# Patient Record
Sex: FEMALE | Race: WHITE | NOT HISPANIC OR LATINO | Employment: UNEMPLOYED | URBAN - NONMETROPOLITAN AREA
[De-identification: names, ages, dates, MRNs, and addresses within clinical notes are randomized per-mention and may not be internally consistent; named-entity substitution may affect disease eponyms.]

---

## 2021-08-03 ENCOUNTER — OFFICE VISIT (OUTPATIENT)
Dept: FAMILY MEDICINE | Facility: CLINIC | Age: 41
End: 2021-08-03

## 2021-08-03 VITALS
SYSTOLIC BLOOD PRESSURE: 138 MMHG | WEIGHT: 156 LBS | DIASTOLIC BLOOD PRESSURE: 78 MMHG | HEART RATE: 89 BPM | RESPIRATION RATE: 18 BRPM | BODY MASS INDEX: 29.45 KG/M2 | HEIGHT: 61 IN | TEMPERATURE: 99 F | OXYGEN SATURATION: 98 %

## 2021-08-03 DIAGNOSIS — Z72.51 HIGH RISK SEXUAL BEHAVIOR, UNSPECIFIED TYPE: ICD-10-CM

## 2021-08-03 DIAGNOSIS — Z03.818 ENCNTR FOR OBS FOR SUSP EXPSR TO OTH BIOLG AGENTS RULED OUT: ICD-10-CM

## 2021-08-03 DIAGNOSIS — R52 BODY ACHES: ICD-10-CM

## 2021-08-03 DIAGNOSIS — N39.0 URINARY TRACT INFECTION WITHOUT HEMATURIA, SITE UNSPECIFIED: Primary | ICD-10-CM

## 2021-08-03 DIAGNOSIS — R30.0 DYSURIA: ICD-10-CM

## 2021-08-03 LAB
AMORPH PHOS CRY #/AREA URNS LPF: ABNORMAL /LPF
BACTERIA #/AREA URNS HPF: ABNORMAL /HPF
BILIRUB UR QL STRIP: NEGATIVE
CLARITY UR: ABNORMAL
COLOR UR: YELLOW
CTP QC/QA: YES
FLUAV AG NPH QL: NEGATIVE
FLUBV AG NPH QL: NEGATIVE
GLUCOSE UR STRIP-MCNC: NEGATIVE MG/DL
HAV IGM SER QL: NORMAL
HBV CORE IGM SER QL: NORMAL
HIV 1+O+2 AB SERPL QL: NORMAL
KETONES UR STRIP-SCNC: 15 MG/DL
LEUKOCYTE ESTERASE UR QL STRIP: ABNORMAL
NITRITE UR QL STRIP: NEGATIVE
PH UR STRIP: 5.5 PH UNITS
PROT UR QL STRIP: 100
RBC # UR STRIP: ABNORMAL /UL
RBC #/AREA URNS HPF: ABNORMAL /HPF
SARS-COV-2 AG RESP QL IA.RAPID: NEGATIVE
SP GR UR STRIP: 1.02
SQUAMOUS #/AREA URNS LPF: ABNORMAL /LPF
UROBILINOGEN UR STRIP-ACNC: 0.2 MG/DL
WBC #/AREA URNS HPF: ABNORMAL /HPF

## 2021-08-03 PROCEDURE — 87591 CHLAMYDIA/GONORRHOEAE(GC), PCR: ICD-10-PCS | Mod: ,,, | Performed by: CLINICAL MEDICAL LABORATORY

## 2021-08-03 PROCEDURE — 87529 HSV DNA AMP PROBE: CPT | Mod: 90,,, | Performed by: CLINICAL MEDICAL LABORATORY

## 2021-08-03 PROCEDURE — 81001 URINALYSIS, REFLEX TO URINE CULTURE: ICD-10-PCS | Mod: ,,, | Performed by: CLINICAL MEDICAL LABORATORY

## 2021-08-03 PROCEDURE — 87077 CULTURE, URINE: ICD-10-PCS | Mod: ,,, | Performed by: CLINICAL MEDICAL LABORATORY

## 2021-08-03 PROCEDURE — 87529 HERPES SIMPLEX VIRUS-PCR (BLOOD): ICD-10-PCS | Mod: 90,,, | Performed by: CLINICAL MEDICAL LABORATORY

## 2021-08-03 PROCEDURE — 87428 POCT SARS-COV2 (COVID) WITH FLU ANTIGEN: ICD-10-PCS | Mod: QW,,, | Performed by: FAMILY MEDICINE

## 2021-08-03 PROCEDURE — 87591 N.GONORRHOEAE DNA AMP PROB: CPT | Mod: ,,, | Performed by: CLINICAL MEDICAL LABORATORY

## 2021-08-03 PROCEDURE — 96372 PR INJECTION,THERAP/PROPH/DIAG2ST, IM OR SUBCUT: ICD-10-PCS | Mod: ,,, | Performed by: FAMILY MEDICINE

## 2021-08-03 PROCEDURE — 99214 OFFICE O/P EST MOD 30 MIN: CPT | Mod: 25,,, | Performed by: FAMILY MEDICINE

## 2021-08-03 PROCEDURE — 87186 SC STD MICRODIL/AGAR DIL: CPT | Mod: ,,, | Performed by: CLINICAL MEDICAL LABORATORY

## 2021-08-03 PROCEDURE — 96372 THER/PROPH/DIAG INJ SC/IM: CPT | Mod: ,,, | Performed by: FAMILY MEDICINE

## 2021-08-03 PROCEDURE — 87428 SARSCOV & INF VIR A&B AG IA: CPT | Mod: QW,,, | Performed by: FAMILY MEDICINE

## 2021-08-03 PROCEDURE — 87491 CHLAMYDIA/GONORRHOEAE(GC), PCR: ICD-10-PCS | Mod: ,,, | Performed by: CLINICAL MEDICAL LABORATORY

## 2021-08-03 PROCEDURE — 87086 CULTURE, URINE: ICD-10-PCS | Mod: ,,, | Performed by: CLINICAL MEDICAL LABORATORY

## 2021-08-03 PROCEDURE — 87077 CULTURE AEROBIC IDENTIFY: CPT | Mod: ,,, | Performed by: CLINICAL MEDICAL LABORATORY

## 2021-08-03 PROCEDURE — 86709 HEPATITIS A ANTIBODY, IGM: ICD-10-PCS | Mod: ,,, | Performed by: CLINICAL MEDICAL LABORATORY

## 2021-08-03 PROCEDURE — 87491 CHLMYD TRACH DNA AMP PROBE: CPT | Mod: ,,, | Performed by: CLINICAL MEDICAL LABORATORY

## 2021-08-03 PROCEDURE — 81001 URINALYSIS AUTO W/SCOPE: CPT | Mod: ,,, | Performed by: CLINICAL MEDICAL LABORATORY

## 2021-08-03 PROCEDURE — 99214 PR OFFICE/OUTPT VISIT, EST, LEVL IV, 30-39 MIN: ICD-10-PCS | Mod: 25,,, | Performed by: FAMILY MEDICINE

## 2021-08-03 PROCEDURE — 86709 HEPATITIS A IGM ANTIBODY: CPT | Mod: ,,, | Performed by: CLINICAL MEDICAL LABORATORY

## 2021-08-03 PROCEDURE — 87186 CULTURE, URINE: ICD-10-PCS | Mod: ,,, | Performed by: CLINICAL MEDICAL LABORATORY

## 2021-08-03 PROCEDURE — 86705 HEPATITIS B CORE ANTIBODY, IGM: ICD-10-PCS | Mod: ,,, | Performed by: CLINICAL MEDICAL LABORATORY

## 2021-08-03 PROCEDURE — 87389 HIV 1 / 2 ANTIBODY: ICD-10-PCS | Mod: ,,, | Performed by: CLINICAL MEDICAL LABORATORY

## 2021-08-03 PROCEDURE — 87389 HIV-1 AG W/HIV-1&-2 AB AG IA: CPT | Mod: ,,, | Performed by: CLINICAL MEDICAL LABORATORY

## 2021-08-03 PROCEDURE — 87086 URINE CULTURE/COLONY COUNT: CPT | Mod: ,,, | Performed by: CLINICAL MEDICAL LABORATORY

## 2021-08-03 PROCEDURE — 86705 HEP B CORE ANTIBODY IGM: CPT | Mod: ,,, | Performed by: CLINICAL MEDICAL LABORATORY

## 2021-08-03 RX ORDER — CIPROFLOXACIN 500 MG/1
500 TABLET ORAL EVERY 12 HOURS
Qty: 20 TABLET | Refills: 0 | Status: SHIPPED | OUTPATIENT
Start: 2021-08-03 | End: 2021-11-30 | Stop reason: ALTCHOICE

## 2021-08-03 RX ORDER — KETOROLAC TROMETHAMINE 30 MG/ML
60 INJECTION, SOLUTION INTRAMUSCULAR; INTRAVENOUS
Status: COMPLETED | OUTPATIENT
Start: 2021-08-03 | End: 2021-08-03

## 2021-08-03 RX ORDER — CEFTRIAXONE 1 G/1
1 INJECTION, POWDER, FOR SOLUTION INTRAMUSCULAR; INTRAVENOUS
Status: COMPLETED | OUTPATIENT
Start: 2021-08-03 | End: 2021-08-03

## 2021-08-03 RX ADMIN — KETOROLAC TROMETHAMINE 60 MG: 30 INJECTION, SOLUTION INTRAMUSCULAR; INTRAVENOUS at 09:08

## 2021-08-03 RX ADMIN — CEFTRIAXONE 1 G: 1 INJECTION, POWDER, FOR SOLUTION INTRAMUSCULAR; INTRAVENOUS at 09:08

## 2021-08-04 LAB
CHLAMYDIA BY PCR: NEGATIVE
N. GONORRHOEAE (GC) BY PCR: NEGATIVE
SYPHILIS AB INTERPRETATION: NORMAL

## 2021-08-04 PROCEDURE — 86780 TREPONEMA PALLIDUM (SYPHILIS) ANTIBODY: ICD-10-PCS | Mod: ,,, | Performed by: CLINICAL MEDICAL LABORATORY

## 2021-08-04 PROCEDURE — 86780 TREPONEMA PALLIDUM: CPT | Mod: ,,, | Performed by: CLINICAL MEDICAL LABORATORY

## 2021-08-05 LAB — UA COMPLETE W REFLEX CULTURE PNL UR: ABNORMAL

## 2021-08-06 LAB
HSV1 DNA BLD QL NAA+PROBE: NEGATIVE
HSV2 DNA BLD QL NAA+PROBE: NEGATIVE

## 2021-09-30 ENCOUNTER — TELEPHONE (OUTPATIENT)
Dept: FAMILY MEDICINE | Facility: CLINIC | Age: 41
End: 2021-09-30

## 2021-09-30 ENCOUNTER — OFFICE VISIT (OUTPATIENT)
Dept: FAMILY MEDICINE | Facility: CLINIC | Age: 41
End: 2021-09-30
Payer: OTHER GOVERNMENT

## 2021-09-30 VITALS
HEART RATE: 87 BPM | DIASTOLIC BLOOD PRESSURE: 87 MMHG | TEMPERATURE: 98 F | SYSTOLIC BLOOD PRESSURE: 128 MMHG | WEIGHT: 158 LBS | OXYGEN SATURATION: 98 % | BODY MASS INDEX: 29.83 KG/M2 | RESPIRATION RATE: 18 BRPM | HEIGHT: 61 IN

## 2021-09-30 DIAGNOSIS — Z76.89 ENCOUNTER FOR ASSESSMENT OF ALCOHOL AND DRUG USE: Primary | ICD-10-CM

## 2021-09-30 LAB
CTP QC/QA: YES
POC (AMP) AMPHETAMINE: NEGATIVE
POC (BAR) BARBITURATES: NEGATIVE
POC (BUP) BUPRENORPHINE: NEGATIVE
POC (BZO) BENZODIAZEPINES: ABNORMAL
POC (COC) COCAINE: NEGATIVE
POC (MDMA) METHYLENEDIOXYMETHAMPHETAMINE 3,4: NEGATIVE
POC (MET) METHAMPHETAMINE: NEGATIVE
POC (MOP) OPIATES: NEGATIVE
POC (MTD) METHADONE: NEGATIVE
POC (OXY) OXYCODONE: ABNORMAL
POC (PCP) PHENCYCLIDINE: NEGATIVE
POC (TCA) TRICYCLIC ANTIDEPRESSANTS: NEGATIVE
POC TEMPERATURE (URINE): 90
POC THC: NEGATIVE

## 2021-09-30 PROCEDURE — 80305 POCT URINE DRUG SCREEN PRESUMP: ICD-10-PCS | Mod: QW,GZ,, | Performed by: NURSE PRACTITIONER

## 2021-09-30 PROCEDURE — 99212 PR OFFICE/OUTPT VISIT, EST, LEVL II, 10-19 MIN: ICD-10-PCS | Mod: ,,, | Performed by: NURSE PRACTITIONER

## 2021-09-30 PROCEDURE — 80305 DRUG TEST PRSMV DIR OPT OBS: CPT | Mod: QW,GZ,, | Performed by: NURSE PRACTITIONER

## 2021-09-30 PROCEDURE — 99212 OFFICE O/P EST SF 10 MIN: CPT | Mod: ,,, | Performed by: NURSE PRACTITIONER

## 2021-10-29 ENCOUNTER — OFFICE VISIT (OUTPATIENT)
Dept: FAMILY MEDICINE | Facility: CLINIC | Age: 41
End: 2021-10-29

## 2021-10-29 VITALS
DIASTOLIC BLOOD PRESSURE: 99 MMHG | SYSTOLIC BLOOD PRESSURE: 168 MMHG | BODY MASS INDEX: 31.15 KG/M2 | RESPIRATION RATE: 16 BRPM | HEIGHT: 61 IN | OXYGEN SATURATION: 99 % | TEMPERATURE: 98 F | WEIGHT: 165 LBS | HEART RATE: 73 BPM

## 2021-10-29 DIAGNOSIS — Z76.89 ENCOUNTER FOR ASSESSMENT OF ALCOHOL AND DRUG USE: Primary | ICD-10-CM

## 2021-10-29 PROCEDURE — 80305 DRUG TEST PRSMV DIR OPT OBS: CPT | Mod: QW,,, | Performed by: NURSE PRACTITIONER

## 2021-10-29 PROCEDURE — 80305 POCT URINE DRUG SCREEN PRESUMP: ICD-10-PCS | Mod: QW,,, | Performed by: NURSE PRACTITIONER

## 2021-10-29 PROCEDURE — 99212 OFFICE O/P EST SF 10 MIN: CPT | Mod: ,,, | Performed by: NURSE PRACTITIONER

## 2021-10-29 PROCEDURE — 99212 PR OFFICE/OUTPT VISIT, EST, LEVL II, 10-19 MIN: ICD-10-PCS | Mod: ,,, | Performed by: NURSE PRACTITIONER

## 2021-11-30 ENCOUNTER — OFFICE VISIT (OUTPATIENT)
Dept: FAMILY MEDICINE | Facility: CLINIC | Age: 41
End: 2021-11-30
Payer: OTHER GOVERNMENT

## 2021-11-30 VITALS
SYSTOLIC BLOOD PRESSURE: 120 MMHG | TEMPERATURE: 98 F | DIASTOLIC BLOOD PRESSURE: 76 MMHG | BODY MASS INDEX: 32.12 KG/M2 | WEIGHT: 170 LBS | RESPIRATION RATE: 16 BRPM | OXYGEN SATURATION: 98 % | HEART RATE: 85 BPM

## 2021-11-30 DIAGNOSIS — Z76.89 ENCOUNTER FOR ASSESSMENT OF ALCOHOL AND DRUG USE: Primary | ICD-10-CM

## 2021-11-30 LAB
CTP QC/QA: YES
POC (AMP) AMPHETAMINE: NEGATIVE
POC (BAR) BARBITURATES: NEGATIVE
POC (BUP) BUPRENORPHINE: NEGATIVE
POC (BZO) BENZODIAZEPINES: NEGATIVE
POC (COC) COCAINE: NEGATIVE
POC (MDMA) METHYLENEDIOXYMETHAMPHETAMINE 3,4: NEGATIVE
POC (MET) METHAMPHETAMINE: NEGATIVE
POC (MOP) OPIATES: ABNORMAL
POC (MTD) METHADONE: NEGATIVE
POC (OXY) OXYCODONE: NEGATIVE
POC (PCP) PHENCYCLIDINE: NEGATIVE
POC (TCA) TRICYCLIC ANTIDEPRESSANTS: NEGATIVE
POC TEMPERATURE (URINE): 92
POC THC: NEGATIVE

## 2021-11-30 PROCEDURE — 99212 PR OFFICE/OUTPT VISIT, EST, LEVL II, 10-19 MIN: ICD-10-PCS | Mod: ,,, | Performed by: INTERNAL MEDICINE

## 2021-11-30 PROCEDURE — 99212 OFFICE O/P EST SF 10 MIN: CPT | Mod: ,,, | Performed by: INTERNAL MEDICINE

## 2021-11-30 PROCEDURE — 80305 DRUG TEST PRSMV DIR OPT OBS: CPT | Mod: QW,,, | Performed by: INTERNAL MEDICINE

## 2021-11-30 PROCEDURE — 80305 POCT URINE DRUG SCREEN PRESUMP: ICD-10-PCS | Mod: QW,,, | Performed by: INTERNAL MEDICINE

## 2021-12-03 ENCOUNTER — OFFICE VISIT (OUTPATIENT)
Dept: FAMILY MEDICINE | Facility: CLINIC | Age: 41
End: 2021-12-03

## 2021-12-03 VITALS
TEMPERATURE: 99 F | RESPIRATION RATE: 20 BRPM | WEIGHT: 168.38 LBS | OXYGEN SATURATION: 98 % | HEIGHT: 61 IN | SYSTOLIC BLOOD PRESSURE: 151 MMHG | BODY MASS INDEX: 31.79 KG/M2 | DIASTOLIC BLOOD PRESSURE: 106 MMHG | HEART RATE: 83 BPM

## 2021-12-03 DIAGNOSIS — Z76.89 ENCOUNTER FOR ASSESSMENT OF ALCOHOL AND DRUG USE: Primary | ICD-10-CM

## 2021-12-03 DIAGNOSIS — Z03.818 ENCNTR FOR OBS FOR SUSP EXPSR TO OTH BIOLG AGENTS RULED OUT: ICD-10-CM

## 2021-12-03 LAB
CTP QC/QA: NORMAL
POC (AMP) AMPHETAMINE: NORMAL
POC (BAR) BARBITURATES: NORMAL
POC (BUP) BUPRENORPHINE: NORMAL
POC (BZO) BENZODIAZEPINES: NORMAL
POC (COC) COCAINE: NORMAL
POC (MDMA) METHYLENEDIOXYMETHAMPHETAMINE 3,4: NORMAL
POC (MET) METHAMPHETAMINE: NORMAL
POC (MOP) OPIATES: NORMAL
POC (MTD) METHADONE: NORMAL
POC (OXY) OXYCODONE: NORMAL
POC (PCP) PHENCYCLIDINE: NORMAL
POC (TCA) TRICYCLIC ANTIDEPRESSANTS: NORMAL
POC TEMPERATURE (URINE): NORMAL
POC THC: NORMAL

## 2021-12-03 PROCEDURE — 99212 PR OFFICE/OUTPT VISIT, EST, LEVL II, 10-19 MIN: ICD-10-PCS | Mod: ,,, | Performed by: NURSE PRACTITIONER

## 2021-12-03 PROCEDURE — 99212 OFFICE O/P EST SF 10 MIN: CPT | Mod: ,,, | Performed by: NURSE PRACTITIONER

## 2021-12-03 PROCEDURE — 80305 POCT URINE DRUG SCREEN PRESUMP: ICD-10-PCS | Mod: QW,,, | Performed by: NURSE PRACTITIONER

## 2021-12-03 PROCEDURE — 80305 DRUG TEST PRSMV DIR OPT OBS: CPT | Mod: QW,,, | Performed by: NURSE PRACTITIONER

## 2021-12-06 LAB

## 2023-04-18 ENCOUNTER — HOSPITAL ENCOUNTER (EMERGENCY)
Facility: HOSPITAL | Age: 43
Discharge: HOME | End: 2023-04-18
Attending: FAMILY MEDICINE

## 2023-04-18 VITALS
BODY MASS INDEX: 32.66 KG/M2 | OXYGEN SATURATION: 99 % | HEIGHT: 61 IN | DIASTOLIC BLOOD PRESSURE: 105 MMHG | SYSTOLIC BLOOD PRESSURE: 192 MMHG | RESPIRATION RATE: 17 BRPM | WEIGHT: 173 LBS | TEMPERATURE: 98 F | HEART RATE: 111 BPM

## 2023-04-18 VITALS
DIASTOLIC BLOOD PRESSURE: 101 MMHG | BODY MASS INDEX: 32.66 KG/M2 | HEIGHT: 61 IN | RESPIRATION RATE: 22 BRPM | WEIGHT: 173 LBS | SYSTOLIC BLOOD PRESSURE: 197 MMHG | OXYGEN SATURATION: 100 %

## 2023-04-18 DIAGNOSIS — R20.2 PARESTHESIA: ICD-10-CM

## 2023-04-18 DIAGNOSIS — I10 HYPERTENSION, UNSPECIFIED TYPE: Primary | ICD-10-CM

## 2023-04-18 DIAGNOSIS — R11.2 NAUSEA AND VOMITING, UNSPECIFIED VOMITING TYPE: Primary | ICD-10-CM

## 2023-04-18 LAB
ALBUMIN SERPL BCP-MCNC: 4.8 G/DL (ref 3.5–5)
ALBUMIN/GLOB SERPL: 1.2 {RATIO}
ALP SERPL-CCNC: 84 U/L (ref 37–98)
ALT SERPL W P-5'-P-CCNC: 22 U/L (ref 13–56)
AMYLASE SERPL-CCNC: 17 U/L (ref 25–115)
ANION GAP SERPL CALCULATED.3IONS-SCNC: 19 MMOL/L (ref 7–16)
ANION GAP SERPL CALCULATED.3IONS-SCNC: 24 MMOL/L (ref 7–16)
AST SERPL W P-5'-P-CCNC: 16 U/L (ref 15–37)
BASOPHILS # BLD AUTO: 0.07 K/UL (ref 0–0.2)
BASOPHILS NFR BLD AUTO: 0.4 % (ref 0–1)
BILIRUB SERPL-MCNC: 0.5 MG/DL (ref ?–1.2)
BUN SERPL-MCNC: 17 MG/DL (ref 7–18)
BUN SERPL-MCNC: 18 MG/DL (ref 7–18)
BUN/CREAT SERPL: 13 (ref 6–20)
BUN/CREAT SERPL: 17 (ref 6–20)
CALCIUM SERPL-MCNC: 9 MG/DL (ref 8.5–10.1)
CALCIUM SERPL-MCNC: 9.7 MG/DL (ref 8.5–10.1)
CHLORIDE SERPL-SCNC: 97 MMOL/L (ref 98–107)
CHLORIDE SERPL-SCNC: 97 MMOL/L (ref 98–107)
CO2 SERPL-SCNC: 18 MMOL/L (ref 21–32)
CO2 SERPL-SCNC: 24 MMOL/L (ref 21–32)
CREAT SERPL-MCNC: 1.09 MG/DL (ref 0.55–1.02)
CREAT SERPL-MCNC: 1.28 MG/DL (ref 0.55–1.02)
DIFFERENTIAL METHOD BLD: ABNORMAL
EGFR (NO RACE VARIABLE) (RUSH/TITUS): 54 ML/MIN/1.73M²
EGFR (NO RACE VARIABLE) (RUSH/TITUS): 66 ML/MIN/1.73M²
EOSINOPHIL # BLD AUTO: 0.01 K/UL (ref 0–0.5)
EOSINOPHIL NFR BLD AUTO: 0.1 % (ref 1–4)
ERYTHROCYTE [DISTWIDTH] IN BLOOD BY AUTOMATED COUNT: 15.4 % (ref 11.5–14.5)
GLOBULIN SER-MCNC: 4 G/DL (ref 2–4)
GLUCOSE SERPL-MCNC: 206 MG/DL (ref 74–106)
GLUCOSE SERPL-MCNC: 212 MG/DL (ref 70–105)
GLUCOSE SERPL-MCNC: 285 MG/DL (ref 74–106)
HCT VFR BLD AUTO: 37.8 % (ref 38–47)
HGB BLD-MCNC: 12.6 G/DL (ref 12–16)
HYPOCHROMIA BLD QL SMEAR: ABNORMAL
LIPASE SERPL-CCNC: 61 U/L (ref 73–393)
LYMPHOCYTES # BLD AUTO: 1.67 K/UL (ref 1–4.8)
LYMPHOCYTES NFR BLD AUTO: 9.5 % (ref 27–41)
LYMPHOCYTES NFR BLD MANUAL: 11 % (ref 27–41)
MCH RBC QN AUTO: 25.3 PG (ref 27–31)
MCHC RBC AUTO-ENTMCNC: 33.3 G/DL (ref 32–36)
MCV RBC AUTO: 75.9 FL (ref 80–96)
MONOCYTES # BLD AUTO: 0.58 K/UL (ref 0–0.8)
MONOCYTES NFR BLD AUTO: 3.3 % (ref 2–6)
MONOCYTES NFR BLD MANUAL: 1 % (ref 2–6)
MPC BLD CALC-MCNC: 9.9 FL (ref 9.4–12.4)
NEUTROPHILS # BLD AUTO: 15.29 K/UL (ref 1.8–7.7)
NEUTROPHILS NFR BLD AUTO: 86.7 % (ref 53–65)
NEUTS BAND NFR BLD MANUAL: 1 % (ref 1–5)
NEUTS SEG NFR BLD MANUAL: 87 % (ref 50–62)
NRBC BLD MANUAL-RTO: ABNORMAL %
PLATELET # BLD AUTO: 368 K/UL (ref 150–400)
POTASSIUM SERPL-SCNC: 2.8 MMOL/L (ref 3.5–5.1)
POTASSIUM SERPL-SCNC: 3.1 MMOL/L (ref 3.5–5.1)
PROT SERPL-MCNC: 8.8 G/DL (ref 6.4–8.2)
RBC # BLD AUTO: 4.98 M/UL (ref 4.2–5.4)
SODIUM SERPL-SCNC: 136 MMOL/L (ref 136–145)
SODIUM SERPL-SCNC: 137 MMOL/L (ref 136–145)
WBC # BLD AUTO: 17.62 K/UL (ref 4.5–11)

## 2023-04-18 PROCEDURE — 85025 COMPLETE CBC W/AUTO DIFF WBC: CPT | Performed by: FAMILY MEDICINE

## 2023-04-18 PROCEDURE — 25000003 PHARM REV CODE 250: Performed by: REGISTERED NURSE

## 2023-04-18 PROCEDURE — 80053 COMPREHEN METABOLIC PANEL: CPT | Performed by: FAMILY MEDICINE

## 2023-04-18 PROCEDURE — 96375 TX/PRO/DX INJ NEW DRUG ADDON: CPT

## 2023-04-18 PROCEDURE — 96367 TX/PROPH/DG ADDL SEQ IV INF: CPT

## 2023-04-18 PROCEDURE — 83690 ASSAY OF LIPASE: CPT | Performed by: FAMILY MEDICINE

## 2023-04-18 PROCEDURE — 82150 ASSAY OF AMYLASE: CPT | Performed by: FAMILY MEDICINE

## 2023-04-18 PROCEDURE — 63600175 PHARM REV CODE 636 W HCPCS: Performed by: REGISTERED NURSE

## 2023-04-18 PROCEDURE — 83036 HEMOGLOBIN GLYCOSYLATED A1C: CPT | Performed by: FAMILY MEDICINE

## 2023-04-18 PROCEDURE — 63600175 PHARM REV CODE 636 W HCPCS: Performed by: FAMILY MEDICINE

## 2023-04-18 PROCEDURE — 25000003 PHARM REV CODE 250: Performed by: FAMILY MEDICINE

## 2023-04-18 PROCEDURE — 99285 EMERGENCY DEPT VISIT HI MDM: CPT | Mod: 25

## 2023-04-18 PROCEDURE — 80048 BASIC METABOLIC PNL TOTAL CA: CPT | Mod: XB | Performed by: FAMILY MEDICINE

## 2023-04-18 PROCEDURE — 99284 EMERGENCY DEPT VISIT MOD MDM: CPT | Mod: ,,, | Performed by: REGISTERED NURSE

## 2023-04-18 PROCEDURE — 82962 GLUCOSE BLOOD TEST: CPT

## 2023-04-18 PROCEDURE — 96366 THER/PROPH/DIAG IV INF ADDON: CPT

## 2023-04-18 PROCEDURE — 96376 TX/PRO/DX INJ SAME DRUG ADON: CPT

## 2023-04-18 PROCEDURE — 99284 PR EMERGENCY DEPT VISIT,LEVEL IV: ICD-10-PCS | Mod: ,,, | Performed by: FAMILY MEDICINE

## 2023-04-18 PROCEDURE — 99284 EMERGENCY DEPT VISIT MOD MDM: CPT | Mod: ,,, | Performed by: FAMILY MEDICINE

## 2023-04-18 PROCEDURE — 96365 THER/PROPH/DIAG IV INF INIT: CPT

## 2023-04-18 PROCEDURE — 99284 PR EMERGENCY DEPT VISIT,LEVEL IV: ICD-10-PCS | Mod: ,,, | Performed by: REGISTERED NURSE

## 2023-04-18 RX ORDER — ONDANSETRON 2 MG/ML
4 INJECTION INTRAMUSCULAR; INTRAVENOUS
Status: COMPLETED | OUTPATIENT
Start: 2023-04-18 | End: 2023-04-18

## 2023-04-18 RX ORDER — LABETALOL HYDROCHLORIDE 5 MG/ML
10 INJECTION, SOLUTION INTRAVENOUS
Status: COMPLETED | OUTPATIENT
Start: 2023-04-18 | End: 2023-04-18

## 2023-04-18 RX ORDER — ONDANSETRON 4 MG/1
4 TABLET, FILM COATED ORAL EVERY 6 HOURS
Qty: 12 TABLET | Refills: 0 | Status: SHIPPED | OUTPATIENT
Start: 2023-04-18 | End: 2023-05-01

## 2023-04-18 RX ORDER — HYDRALAZINE HYDROCHLORIDE 20 MG/ML
10 INJECTION INTRAMUSCULAR; INTRAVENOUS
Status: COMPLETED | OUTPATIENT
Start: 2023-04-18 | End: 2023-04-18

## 2023-04-18 RX ORDER — KETOROLAC TROMETHAMINE 30 MG/ML
30 INJECTION, SOLUTION INTRAMUSCULAR; INTRAVENOUS
Status: COMPLETED | OUTPATIENT
Start: 2023-04-18 | End: 2023-04-18

## 2023-04-18 RX ORDER — POTASSIUM CHLORIDE 20 MEQ/1
20 TABLET, EXTENDED RELEASE ORAL 2 TIMES DAILY
Qty: 14 TABLET | Refills: 0 | Status: SHIPPED | OUTPATIENT
Start: 2023-04-18 | End: 2023-04-19

## 2023-04-18 RX ORDER — SODIUM CHLORIDE 9 MG/ML
1000 INJECTION, SOLUTION INTRAVENOUS
Status: COMPLETED | OUTPATIENT
Start: 2023-04-18 | End: 2023-04-18

## 2023-04-18 RX ORDER — POTASSIUM CHLORIDE 7.45 MG/ML
10 INJECTION INTRAVENOUS ONCE
Status: COMPLETED | OUTPATIENT
Start: 2023-04-18 | End: 2023-04-18

## 2023-04-18 RX ORDER — ONDANSETRON 4 MG/1
4 TABLET, FILM COATED ORAL EVERY 6 HOURS
Qty: 12 TABLET | Refills: 0 | Status: SHIPPED | OUTPATIENT
Start: 2023-04-18 | End: 2023-04-18 | Stop reason: SDUPTHER

## 2023-04-18 RX ORDER — PROMETHAZINE HYDROCHLORIDE 25 MG/1
25 TABLET ORAL EVERY 6 HOURS PRN
Qty: 15 TABLET | Refills: 0 | Status: SHIPPED | OUTPATIENT
Start: 2023-04-18 | End: 2023-04-19

## 2023-04-18 RX ADMIN — LABETALOL HYDROCHLORIDE 10 MG: 5 INJECTION INTRAVENOUS at 05:04

## 2023-04-18 RX ADMIN — HYDRALAZINE HYDROCHLORIDE 10 MG: 20 INJECTION INTRAMUSCULAR; INTRAVENOUS at 07:04

## 2023-04-18 RX ADMIN — LABETALOL HYDROCHLORIDE 10 MG: 5 INJECTION, SOLUTION INTRAVENOUS at 08:04

## 2023-04-18 RX ADMIN — SODIUM CHLORIDE, POTASSIUM CHLORIDE, SODIUM LACTATE AND CALCIUM CHLORIDE 1000 ML: 600; 310; 30; 20 INJECTION, SOLUTION INTRAVENOUS at 07:04

## 2023-04-18 RX ADMIN — POTASSIUM CHLORIDE 10 MEQ: 10 INJECTION, SOLUTION INTRAVENOUS at 08:04

## 2023-04-18 RX ADMIN — HYDRALAZINE HYDROCHLORIDE 10 MG: 20 INJECTION INTRAMUSCULAR; INTRAVENOUS at 06:04

## 2023-04-18 RX ADMIN — PROMETHAZINE HYDROCHLORIDE 12.5 MG: 25 INJECTION INTRAMUSCULAR; INTRAVENOUS at 09:04

## 2023-04-18 RX ADMIN — KETOROLAC TROMETHAMINE 30 MG: 30 INJECTION, SOLUTION INTRAMUSCULAR; INTRAVENOUS at 05:04

## 2023-04-18 RX ADMIN — PROMETHAZINE HYDROCHLORIDE 25 MG: 25 INJECTION INTRAMUSCULAR; INTRAVENOUS at 09:04

## 2023-04-18 RX ADMIN — POTASSIUM CHLORIDE 10 MEQ: 10 INJECTION, SOLUTION INTRAVENOUS at 10:04

## 2023-04-18 RX ADMIN — POTASSIUM CHLORIDE 10 MEQ: 10 INJECTION, SOLUTION INTRAVENOUS at 06:04

## 2023-04-18 RX ADMIN — ONDANSETRON 4 MG: 2 INJECTION INTRAMUSCULAR; INTRAVENOUS at 06:04

## 2023-04-18 RX ADMIN — SODIUM CHLORIDE 1000 ML: 9 INJECTION, SOLUTION INTRAVENOUS at 09:04

## 2023-04-18 NOTE — DISCHARGE INSTRUCTIONS
Increase fluids. Follow up for any new or worsening problems or otherwise as needed. Follow up with your regular doctor for BP evaluation and management

## 2023-04-18 NOTE — ED PROVIDER NOTES
Encounter Date: 4/18/2023       History     Chief Complaint   Patient presents with    Nausea    Vomiting    Diarrhea     Pt here with N/V/D. States started early this mornig. No one else with similar symptoms. Has not eating anything out of the normal for her.     The history is provided by the patient.   Review of patient's allergies indicates:   Allergen Reactions    Darvocet a500 [propoxyphene n-acetaminophen]      Past Medical History:   Diagnosis Date    Hypertension      Past Surgical History:   Procedure Laterality Date    ADENOIDECTOMY      TUBAL LIGATION       Family History   Problem Relation Age of Onset    Cancer Mother      Social History     Tobacco Use    Smoking status: Never    Smokeless tobacco: Never   Substance Use Topics    Alcohol use: Not Currently    Drug use: Never     Review of Systems   Constitutional:  Positive for activity change and fatigue.   Gastrointestinal:  Positive for abdominal pain, diarrhea and vomiting.   Neurological:  Positive for weakness.     Physical Exam     Initial Vitals [04/18/23 0848]   BP Pulse Resp Temp SpO2   (!) 197/101 -- (!) 22 -- 100 %      MAP       --         Physical Exam    Constitutional: She appears well-developed and well-nourished.   HENT:   Head: Normocephalic and atraumatic.   Eyes: EOM are normal. Pupils are equal, round, and reactive to light.   Neck: Neck supple.   Normal range of motion.  Cardiovascular:  Normal rate and regular rhythm.           Pulmonary/Chest: Breath sounds normal.   Abdominal: Abdomen is soft.   Musculoskeletal:         General: Normal range of motion.      Cervical back: Normal range of motion and neck supple.     Neurological: She is alert and oriented to person, place, and time.   Psychiatric: She has a normal mood and affect. Her behavior is normal. Thought content normal.       Medical Screening Exam   See Full Note    ED Course   Procedures  Labs Reviewed   COMPREHENSIVE METABOLIC PANEL - Abnormal; Notable for the  following components:       Result Value    Potassium 3.1 (*)     Chloride 97 (*)     Anion Gap 19 (*)     Glucose 285 (*)     Creatinine 1.09 (*)     Total Protein 8.8 (*)     All other components within normal limits   AMYLASE - Abnormal; Notable for the following components:    Amylase 17 (*)     All other components within normal limits   LIPASE - Abnormal; Notable for the following components:    Lipase 61 (*)     All other components within normal limits   CBC WITH DIFFERENTIAL - Abnormal; Notable for the following components:    WBC 17.62 (*)     Hematocrit 37.8 (*)     MCV 75.9 (*)     MCH 25.3 (*)     RDW 15.4 (*)     Neutrophils % 86.7 (*)     Lymphocytes % 9.5 (*)     Neutrophils, Abs 15.29 (*)     Eosinophils % 0.1 (*)     All other components within normal limits   MANUAL DIFFERENTIAL - Abnormal; Notable for the following components:    Segmented Neutrophils, Man % 87 (*)     Lymphocytes, Man % 11 (*)     Monocytes, Man % 1 (*)     All other components within normal limits   CBC W/ AUTO DIFFERENTIAL    Narrative:     The following orders were created for panel order CBC auto differential.  Procedure                               Abnormality         Status                     ---------                               -----------         ------                     CBC with Differential[[id number]]        Abnormal            Final result               Manual Differential[[id number]]          Abnormal            Final result                 Please view results for these tests on the individual orders.          Imaging Results              CT Abdomen Pelvis  Without Contrast (Final result)  Result time 04/18/23 10:46:13      Final result by Oscar Ramirez II, MD (04/18/23 10:46:13)                   Impression:      No evidence of abnormality demonstrated.      Electronically signed by: Oscar Ramirez  Date:    04/18/2023  Time:    10:46               Narrative:    EXAMINATION:  CT ABDOMEN PELVIS WITHOUT  CONTRAST    CLINICAL HISTORY:  Abdominal pain, acute, nonlocalized;    TECHNIQUE:  Axial CT imaging of the abdomen and pelvis is performed without contrast.    CT dose reduction technique used - Dose Rite and tube current modulation.    COMPARISON:  3 April 2019    FINDINGS:  Cardiac and lung bases are within normal limits    CT abdomen: The liver, spleen, pancreas and adrenal glands are normal in size and density.  No evidence of focal lesion is demonstrated in these solid organs.    Kidneys are normal in size and density.  No evidence of hydronephrosis or nephrolithiasis is seen.    The bowel caliber is normal and no wall thickening or adjacent inflammatory change is seen.  No evidence of free fluid or free air is present.  Appendix is normal.    CT pelvis: The bowel and bladder appear within normal limits.  The pelvic organs show no evidence of abnormality                                       Medications   0.9%  NaCl infusion (0 mLs Intravenous Stopped 4/18/23 1213)   promethazine (PHENERGAN) 12.5 mg in dextrose 5 % (D5W) 50 mL IVPB (0 mg Intravenous Stopped 4/18/23 0943)   potassium chloride 10 mEq in 100 mL IVPB (0 mEq Intravenous Stopped 4/18/23 1213)     Medical Decision Making:   ED Management:  N/V/D  NROMAL SALINE, PHENERGAN AND POTASSIUM GIVEN  CT SCAN NEGATIVE  D/C HOME WITH ZOFRAN                         Clinical Impression:   Final diagnoses:  [R11.2] Nausea and vomiting, unspecified vomiting type (Primary)        ED Disposition Condition    Discharge           ED Prescriptions       Medication Sig Dispense Start Date End Date Auth. Provider    ondansetron (ZOFRAN) 4 MG tablet  (Status: Discontinued) Take 1 tablet (4 mg total) by mouth every 6 (six) hours. 12 tablet 4/18/2023 4/18/2023 Adela Driver MD    ondansetron (ZOFRAN) 4 MG tablet Take 1 tablet (4 mg total) by mouth every 6 (six) hours. 12 tablet 4/18/2023 -- Adela Driver MD          Follow-up Information       Follow up With Specialties  Details Why Contact Info    Khanh Trinh MD Family Medicine, Emergency Medicine   [address] [facility] [place] [address]  [phone]               Adela Driver MD  04/18/23 1111       Adela Driver MD  04/18/23 1241

## 2023-04-18 NOTE — ED TRIAGE NOTES
Received patient ambulatory, voices nausea, vomiting and diarrhea since 2 a.m. patient to bathroom.

## 2023-04-18 NOTE — ED PROVIDER NOTES
Encounter Date: 4/18/2023       History     Chief Complaint   Patient presents with    Numbness     Hand and feet     PT RETURNS TO ER WITH COMPLAINTS OF BILATERAL UPPER AND LOWER EXTREMITY NUMBNESS. WAS SEEN HERE EARLIER TODAY WITH N/V. TREATED WITH PHENERGAN AND FLUIDS. ALSO GIVEN POTASSIUM. STATES WAS AT THE [employer] STORE BUYING SNACKS AND BEGAME TO FEEL HER FACE AND EXTREMITIES WITH NUMBNESS.     The history is provided by the patient.   Review of patient's allergies indicates:   Allergen Reactions    Darvocet a500 [propoxyphene n-acetaminophen]      Past Medical History:   Diagnosis Date    Hypertension      Past Surgical History:   Procedure Laterality Date    ADENOIDECTOMY      TUBAL LIGATION       Family History   Problem Relation Age of Onset    Cancer Mother      Social History     Tobacco Use    Smoking status: Never    Smokeless tobacco: Never   Substance Use Topics    Alcohol use: Not Currently    Drug use: Never     Review of Systems   Constitutional:  Positive for activity change and fatigue.   Neurological:  Positive for weakness and numbness.     Physical Exam     Initial Vitals   BP Pulse Resp Temp SpO2   04/18/23 1628 04/18/23 1625 04/18/23 1625 04/18/23 1625 04/18/23 1625   (!) 207/132 102 (!) 24 98.3 °F (36.8 °C) 99 %      MAP       --                Physical Exam    Constitutional: She appears well-developed and well-nourished.   HENT:   Head: Normocephalic and atraumatic.   Eyes: EOM are normal. Pupils are equal, round, and reactive to light.   Neck: Neck supple.   Normal range of motion.  Cardiovascular:  Normal rate and regular rhythm.           Abdominal: Abdomen is soft.   Musculoskeletal:         General: Normal range of motion.      Cervical back: Normal range of motion and neck supple.     Neurological: She is alert and oriented to person, place, and time. She has normal strength. No cranial nerve deficit or sensory deficit. GCS score is 15. GCS eye subscore is 4. GCS verbal subscore is  5. GCS motor subscore is 6.   Skin: Skin is warm.   Psychiatric: She has a normal mood and affect. Her behavior is normal. Judgment and thought content normal.       Medical Screening Exam   See Full Note    ED Course   Procedures  Labs Reviewed   BASIC METABOLIC PANEL - Abnormal; Notable for the following components:       Result Value    Potassium 2.8 (*)     Chloride 97 (*)     CO2 18 (*)     Anion Gap 24 (*)     Glucose 206 (*)     Creatinine 1.28 (*)     eGFR 54 (*)     All other components within normal limits   POCT GLUCOSE MONITORING CONTINUOUS - Abnormal; Notable for the following components:    POC Glucose 212 (*)     All other components within normal limits   HEMOGLOBIN A1C          Imaging Results              CT Head Without Contrast (Final result)  Result time 04/18/23 16:59:55      Final result by Oscar Ramirez II, MD (04/18/23 16:59:55)                   Impression:      No evidence of abnormality demonstrated.      Electronically signed by: Oscar Ramirez  Date:    04/18/2023  Time:    16:59               Narrative:    EXAMINATION:  CT HEAD WITHOUT CONTRAST    CLINICAL HISTORY:  Neuro deficit, acute, stroke suspected;    TECHNIQUE:  Axial CT imaging of the brain is performed without contrast with 3 mm increments.    CT dose reduction technique used - Dose Rite and tube current modulation.    COMPARISON:  6 April 2019    FINDINGS:  No evidence of hemorrhage, mass, mass effect, midline shift or acute infarct seen.  The brain parenchyma attenuation and differentiation appears within normal limits. The ventricles and cisterns are normal in caliber.  No cranial or skull base abnormality is identified.                                       Medications   labetaloL injection 10 mg (10 mg Intravenous Given 4/18/23 1372)   ketorolac injection 30 mg (30 mg Intravenous Given 4/18/23 1751)   ondansetron injection 4 mg (4 mg Intravenous Given 4/18/23 1804)   potassium chloride 10 mEq in 100 mL IVPB (0 mEq  "Intravenous Stopped 4/18/23 2129)   potassium chloride 10 mEq in 100 mL IVPB (0 mEq Intravenous Stopped 4/18/23 2003)   hydrALAZINE injection 10 mg (10 mg Intravenous Given 4/18/23 1827)   hydrALAZINE injection 10 mg (10 mg Intravenous Given 4/18/23 1936)   lactated ringers bolus 1,000 mL (1,000 mLs Intravenous New Bag 4/18/23 1944)   labetaloL injection 10 mg (10 mg Intravenous Given 4/18/23 2042)   promethazine (PHENERGAN) 25 mg in dextrose 5 % (D5W) 50 mL IVPB (25 mg Intravenous New Bag 4/18/23 2130)     Medical Decision Making:   Initial Assessment:   Assumed care of patient from Dr. Driver. 42 y-old with complaint of N/V/D that has been ongoing "all day."   Differential Diagnosis:   -Nausea  -Vomiting  -Hypokalemia  -HTN urgency  -Gastroenteritis  -  ED Management:  Assumed care from Dr. Driver. Patients presentation, history and management were discussed with MD. Patient is currently receiving K+ rider for a potassium of 2.8. Still complains of Nausea/vomiting and diarrhea that has been TNTC. Patient has not vomited or had diarrhea since I took over her care. Patient does have an elevated BP that was as high as 207/132 per chart. Examination of her previous records show a bp of 160/99 in October of 2021 and 156/106 in December of 2021. Patient states that she has a history of HTN, but does not currently take any medications.                        Clinical Impression:   Final diagnoses:  [I10] Hypertension, unspecified type (Primary)  [R20.2] Paresthesia        ED Disposition Condition    Discharge Stable          ED Prescriptions    None       Follow-up Information       Follow up With Specialties Details Why Contact Info    Khanh Trinh MD Family Medicine, Emergency Medicine   [address]  [facility] [place] [address] [phone]               HUMBERTO Hoff  04/18/23 2151    "

## 2023-04-19 LAB
EST. AVERAGE GLUCOSE BLD GHB EST-MCNC: 120 MG/DL
HBA1C MFR BLD HPLC: 6.2 % (ref 4.5–6.6)

## 2023-04-19 RX ORDER — PROMETHAZINE HYDROCHLORIDE 25 MG/1
25 TABLET ORAL EVERY 6 HOURS PRN
Qty: 15 TABLET | Refills: 0 | Status: SHIPPED | OUTPATIENT
Start: 2023-04-19 | End: 2023-05-01

## 2023-04-19 RX ORDER — POTASSIUM CHLORIDE 20 MEQ/1
20 TABLET, EXTENDED RELEASE ORAL 2 TIMES DAILY
Qty: 14 TABLET | Refills: 0 | Status: SHIPPED | OUTPATIENT
Start: 2023-04-19 | End: 2023-04-26

## 2023-04-20 ENCOUNTER — HOSPITAL ENCOUNTER (INPATIENT)
Facility: HOSPITAL | Age: 43
LOS: 3 days | Discharge: HOME | DRG: 690 | End: 2023-04-23
Attending: FAMILY MEDICINE | Admitting: FAMILY MEDICINE

## 2023-04-20 DIAGNOSIS — N39.0 UTI (URINARY TRACT INFECTION): ICD-10-CM

## 2023-04-20 DIAGNOSIS — R11.2 INTRACTABLE NAUSEA AND VOMITING: Primary | ICD-10-CM

## 2023-04-20 DIAGNOSIS — R10.9 ABDOMINAL PAIN, UNSPECIFIED ABDOMINAL LOCATION: ICD-10-CM

## 2023-04-20 DIAGNOSIS — I10 HYPERTENSION, UNSPECIFIED TYPE: ICD-10-CM

## 2023-04-20 DIAGNOSIS — E87.6 HYPOKALEMIA: ICD-10-CM

## 2023-04-20 DIAGNOSIS — N17.9 ACUTE RENAL FAILURE, UNSPECIFIED ACUTE RENAL FAILURE TYPE: ICD-10-CM

## 2023-04-20 DIAGNOSIS — N30.00 ACUTE CYSTITIS WITHOUT HEMATURIA: ICD-10-CM

## 2023-04-20 DIAGNOSIS — R00.0 TACHYCARDIA: ICD-10-CM

## 2023-04-20 DIAGNOSIS — N39.0 URINARY TRACT INFECTION WITHOUT HEMATURIA, SITE UNSPECIFIED: ICD-10-CM

## 2023-04-20 PROBLEM — R11.0 NAUSEA: Status: ACTIVE | Noted: 2023-04-20

## 2023-04-20 LAB
ALBUMIN SERPL BCP-MCNC: 4.8 G/DL (ref 3.5–5)
ALBUMIN/GLOB SERPL: 1.2 {RATIO}
ALP SERPL-CCNC: 81 U/L (ref 37–98)
ALT SERPL W P-5'-P-CCNC: 24 U/L (ref 13–56)
AMPHET UR QL SCN: NEGATIVE
ANION GAP SERPL CALCULATED.3IONS-SCNC: 19 MMOL/L (ref 7–16)
AST SERPL W P-5'-P-CCNC: 19 U/L (ref 15–37)
BARBITURATES UR QL SCN: NEGATIVE
BASOPHILS # BLD AUTO: 0.05 K/UL (ref 0–0.2)
BASOPHILS NFR BLD AUTO: 0.2 % (ref 0–1)
BENZODIAZ METAB UR QL SCN: NEGATIVE
BILIRUB SERPL-MCNC: 0.8 MG/DL (ref ?–1.2)
BILIRUB UR QL STRIP: ABNORMAL
BUN SERPL-MCNC: 27 MG/DL (ref 7–18)
BUN/CREAT SERPL: 21 (ref 6–20)
CALCIUM SERPL-MCNC: 9.1 MG/DL (ref 8.5–10.1)
CANNABINOIDS UR QL SCN: POSITIVE
CHLORIDE SERPL-SCNC: 96 MMOL/L (ref 98–107)
CLARITY UR: CLEAR
CO2 SERPL-SCNC: 23 MMOL/L (ref 21–32)
COCAINE UR QL SCN: NEGATIVE
COLOR UR: YELLOW
CREAT SERPL-MCNC: 1.27 MG/DL (ref 0.55–1.02)
EGFR (NO RACE VARIABLE) (RUSH/TITUS): 55 ML/MIN/1.73M²
EOSINOPHIL # BLD AUTO: 0.01 K/UL (ref 0–0.5)
EOSINOPHIL NFR BLD AUTO: 0 % (ref 1–4)
ERYTHROCYTE [DISTWIDTH] IN BLOOD BY AUTOMATED COUNT: 16.5 % (ref 11.5–14.5)
FLUAV AG UPPER RESP QL IA.RAPID: NEGATIVE
FLUBV AG UPPER RESP QL IA.RAPID: NEGATIVE
GLOBULIN SER-MCNC: 3.9 G/DL (ref 2–4)
GLUCOSE SERPL-MCNC: 167 MG/DL (ref 74–106)
GLUCOSE UR STRIP-MCNC: NEGATIVE MG/DL
HCG UR QL IA.RAPID: NEGATIVE
HCT VFR BLD AUTO: 41.5 % (ref 38–47)
HGB BLD-MCNC: 13.9 G/DL (ref 12–16)
KETONES UR STRIP-SCNC: NEGATIVE MG/DL
LACTATE SERPL-SCNC: 1.6 MMOL/L (ref 0.4–2)
LACTATE SERPL-SCNC: 2.2 MMOL/L (ref 0.4–2)
LEUKOCYTE ESTERASE UR QL STRIP: ABNORMAL
LIPASE SERPL-CCNC: 73 U/L (ref 73–393)
LYMPHOCYTES # BLD AUTO: 3.94 K/UL (ref 1–4.8)
LYMPHOCYTES NFR BLD AUTO: 16.9 % (ref 27–41)
LYMPHOCYTES NFR BLD MANUAL: 14 % (ref 27–41)
MAGNESIUM SERPL-MCNC: 2 MG/DL (ref 1.7–2.3)
MCH RBC QN AUTO: 24.9 PG (ref 27–31)
MCHC RBC AUTO-ENTMCNC: 33.5 G/DL (ref 32–36)
MCV RBC AUTO: 74.4 FL (ref 80–96)
MICROCYTES BLD QL SMEAR: ABNORMAL
MONOCYTES # BLD AUTO: 1.69 K/UL (ref 0–0.8)
MONOCYTES NFR BLD AUTO: 7.3 % (ref 2–6)
MONOCYTES NFR BLD MANUAL: 7 % (ref 2–6)
MPC BLD CALC-MCNC: 9.6 FL (ref 9.4–12.4)
NEUTROPHILS # BLD AUTO: 17.56 K/UL (ref 1.8–7.7)
NEUTROPHILS NFR BLD AUTO: 75.6 % (ref 53–65)
NEUTS BAND NFR BLD MANUAL: 1 % (ref 1–5)
NEUTS SEG NFR BLD MANUAL: 78 % (ref 50–62)
NITRITE UR QL STRIP: POSITIVE
NRBC BLD MANUAL-RTO: ABNORMAL %
OPIATES UR QL SCN: POSITIVE
PCP UR QL SCN: NEGATIVE
PH UR STRIP: 6 PH UNITS
PLATELET # BLD AUTO: 414 K/UL (ref 150–400)
PLATELET MORPHOLOGY: NORMAL
POTASSIUM SERPL-SCNC: 2.8 MMOL/L (ref 3.5–5.1)
PROT SERPL-MCNC: 8.7 G/DL (ref 6.4–8.2)
PROT UR QL STRIP: >=300
RBC # BLD AUTO: 5.58 M/UL (ref 4.2–5.4)
RBC # UR STRIP: ABNORMAL /UL
SARS-COV+SARS-COV-2 AG RESP QL IA.RAPID: NEGATIVE
SODIUM SERPL-SCNC: 135 MMOL/L (ref 136–145)
SP GR UR STRIP: >=1.03
UROBILINOGEN UR STRIP-ACNC: 1 MG/DL
WBC # BLD AUTO: 23.25 K/UL (ref 4.5–11)

## 2023-04-20 PROCEDURE — 87040 BLOOD CULTURE FOR BACTERIA: CPT | Performed by: NURSE PRACTITIONER

## 2023-04-20 PROCEDURE — 63600175 PHARM REV CODE 636 W HCPCS: Performed by: REGISTERED NURSE

## 2023-04-20 PROCEDURE — 25000003 PHARM REV CODE 250: Performed by: NURSE PRACTITIONER

## 2023-04-20 PROCEDURE — 87428 SARSCOV & INF VIR A&B AG IA: CPT | Performed by: NURSE PRACTITIONER

## 2023-04-20 PROCEDURE — 83605 ASSAY OF LACTIC ACID: CPT | Performed by: NURSE PRACTITIONER

## 2023-04-20 PROCEDURE — 93010 EKG 12-LEAD: ICD-10-PCS | Mod: ,,, | Performed by: STUDENT IN AN ORGANIZED HEALTH CARE EDUCATION/TRAINING PROGRAM

## 2023-04-20 PROCEDURE — 25000003 PHARM REV CODE 250: Performed by: REGISTERED NURSE

## 2023-04-20 PROCEDURE — 85025 COMPLETE CBC W/AUTO DIFF WBC: CPT | Performed by: NURSE PRACTITIONER

## 2023-04-20 PROCEDURE — 94761 N-INVAS EAR/PLS OXIMETRY MLT: CPT

## 2023-04-20 PROCEDURE — 83735 ASSAY OF MAGNESIUM: CPT | Performed by: NURSE PRACTITIONER

## 2023-04-20 PROCEDURE — 81003 URINALYSIS AUTO W/O SCOPE: CPT | Mod: 59 | Performed by: NURSE PRACTITIONER

## 2023-04-20 PROCEDURE — 99285 EMERGENCY DEPT VISIT HI MDM: CPT | Mod: ,,, | Performed by: NURSE PRACTITIONER

## 2023-04-20 PROCEDURE — 63600175 PHARM REV CODE 636 W HCPCS: Performed by: NURSE PRACTITIONER

## 2023-04-20 PROCEDURE — 96374 THER/PROPH/DIAG INJ IV PUSH: CPT

## 2023-04-20 PROCEDURE — 93005 ELECTROCARDIOGRAM TRACING: CPT

## 2023-04-20 PROCEDURE — 81025 URINE PREGNANCY TEST: CPT | Performed by: NURSE PRACTITIONER

## 2023-04-20 PROCEDURE — P9612 CATHETERIZE FOR URINE SPEC: HCPCS

## 2023-04-20 PROCEDURE — 99285 PR EMERGENCY DEPT VISIT,LEVEL V: ICD-10-PCS | Mod: ,,, | Performed by: NURSE PRACTITIONER

## 2023-04-20 PROCEDURE — 99285 EMERGENCY DEPT VISIT HI MDM: CPT | Mod: 25

## 2023-04-20 PROCEDURE — 80053 COMPREHEN METABOLIC PANEL: CPT | Performed by: NURSE PRACTITIONER

## 2023-04-20 PROCEDURE — 93010 ELECTROCARDIOGRAM REPORT: CPT | Mod: ,,, | Performed by: STUDENT IN AN ORGANIZED HEALTH CARE EDUCATION/TRAINING PROGRAM

## 2023-04-20 PROCEDURE — 96375 TX/PRO/DX INJ NEW DRUG ADDON: CPT

## 2023-04-20 PROCEDURE — 11000001 HC ACUTE MED/SURG PRIVATE ROOM

## 2023-04-20 PROCEDURE — 83690 ASSAY OF LIPASE: CPT | Performed by: NURSE PRACTITIONER

## 2023-04-20 PROCEDURE — 80307 DRUG TEST PRSMV CHEM ANLYZR: CPT | Performed by: NURSE PRACTITIONER

## 2023-04-20 PROCEDURE — 87086 URINE CULTURE/COLONY COUNT: CPT | Performed by: NURSE PRACTITIONER

## 2023-04-20 RX ORDER — POTASSIUM CHLORIDE 7.45 MG/ML
20 INJECTION INTRAVENOUS
Status: COMPLETED | OUTPATIENT
Start: 2023-04-20 | End: 2023-04-20

## 2023-04-20 RX ORDER — IBUPROFEN 400 MG/1
400 TABLET ORAL EVERY 6 HOURS PRN
Status: DISCONTINUED | OUTPATIENT
Start: 2023-04-20 | End: 2023-04-23 | Stop reason: HOSPADM

## 2023-04-20 RX ORDER — HYDRALAZINE HYDROCHLORIDE 20 MG/ML
10 INJECTION INTRAMUSCULAR; INTRAVENOUS EVERY 4 HOURS PRN
Status: DISCONTINUED | OUTPATIENT
Start: 2023-04-20 | End: 2023-04-23 | Stop reason: HOSPADM

## 2023-04-20 RX ORDER — ONDANSETRON 2 MG/ML
4 INJECTION INTRAMUSCULAR; INTRAVENOUS
Status: COMPLETED | OUTPATIENT
Start: 2023-04-20 | End: 2023-04-20

## 2023-04-20 RX ORDER — SODIUM CHLORIDE 0.9 % (FLUSH) 0.9 %
10 SYRINGE (ML) INJECTION
Status: DISCONTINUED | OUTPATIENT
Start: 2023-04-20 | End: 2023-04-23 | Stop reason: HOSPADM

## 2023-04-20 RX ORDER — HYDROMORPHONE HYDROCHLORIDE 2 MG/ML
0.5 INJECTION, SOLUTION INTRAMUSCULAR; INTRAVENOUS; SUBCUTANEOUS ONCE
Status: COMPLETED | OUTPATIENT
Start: 2023-04-20 | End: 2023-04-20

## 2023-04-20 RX ORDER — SODIUM CHLORIDE 9 MG/ML
1000 INJECTION, SOLUTION INTRAVENOUS
Status: COMPLETED | OUTPATIENT
Start: 2023-04-20 | End: 2023-04-20

## 2023-04-20 RX ORDER — TALC
6 POWDER (GRAM) TOPICAL NIGHTLY PRN
Status: DISCONTINUED | OUTPATIENT
Start: 2023-04-20 | End: 2023-04-23 | Stop reason: HOSPADM

## 2023-04-20 RX ORDER — ONDANSETRON 2 MG/ML
4 INJECTION INTRAMUSCULAR; INTRAVENOUS EVERY 8 HOURS PRN
Status: DISCONTINUED | OUTPATIENT
Start: 2023-04-20 | End: 2023-04-23 | Stop reason: HOSPADM

## 2023-04-20 RX ORDER — SODIUM CHLORIDE AND POTASSIUM CHLORIDE 150; 450 MG/100ML; MG/100ML
INJECTION, SOLUTION INTRAVENOUS CONTINUOUS
Status: DISCONTINUED | OUTPATIENT
Start: 2023-04-20 | End: 2023-04-23 | Stop reason: HOSPADM

## 2023-04-20 RX ORDER — HYDRALAZINE HYDROCHLORIDE 20 MG/ML
20 INJECTION INTRAMUSCULAR; INTRAVENOUS
Status: COMPLETED | OUTPATIENT
Start: 2023-04-20 | End: 2023-04-20

## 2023-04-20 RX ORDER — HYDROCODONE BITARTRATE AND ACETAMINOPHEN 7.5; 325 MG/1; MG/1
1 TABLET ORAL EVERY 6 HOURS PRN
Status: DISCONTINUED | OUTPATIENT
Start: 2023-04-21 | End: 2023-04-23 | Stop reason: HOSPADM

## 2023-04-20 RX ORDER — KETOROLAC TROMETHAMINE 30 MG/ML
30 INJECTION, SOLUTION INTRAMUSCULAR; INTRAVENOUS
Status: COMPLETED | OUTPATIENT
Start: 2023-04-20 | End: 2023-04-20

## 2023-04-20 RX ADMIN — ONDANSETRON 4 MG: 2 INJECTION INTRAMUSCULAR; INTRAVENOUS at 03:04

## 2023-04-20 RX ADMIN — POTASSIUM CHLORIDE AND SODIUM CHLORIDE: 450; 150 INJECTION, SOLUTION INTRAVENOUS at 09:04

## 2023-04-20 RX ADMIN — CEFTRIAXONE 1 G: 1 INJECTION, POWDER, FOR SOLUTION INTRAMUSCULAR; INTRAVENOUS at 05:04

## 2023-04-20 RX ADMIN — SODIUM CHLORIDE 1000 ML: 9 INJECTION, SOLUTION INTRAVENOUS at 03:04

## 2023-04-20 RX ADMIN — POTASSIUM CHLORIDE 20 MEQ: 10 INJECTION, SOLUTION INTRAVENOUS at 04:04

## 2023-04-20 RX ADMIN — HYDROCODONE BITARTRATE AND ACETAMINOPHEN 1 TABLET: 7.5; 325 TABLET ORAL at 11:04

## 2023-04-20 RX ADMIN — HYDRALAZINE HYDROCHLORIDE 10 MG: 20 INJECTION INTRAMUSCULAR; INTRAVENOUS at 04:04

## 2023-04-20 RX ADMIN — POTASSIUM CHLORIDE AND SODIUM CHLORIDE: 450; 150 INJECTION, SOLUTION INTRAVENOUS at 08:04

## 2023-04-20 RX ADMIN — HYDROMORPHONE HYDROCHLORIDE 0.5 MG: 2 INJECTION, SOLUTION INTRAMUSCULAR; INTRAVENOUS; SUBCUTANEOUS at 06:04

## 2023-04-20 RX ADMIN — HYDRALAZINE HYDROCHLORIDE 10 MG: 20 INJECTION INTRAMUSCULAR; INTRAVENOUS at 08:04

## 2023-04-20 RX ADMIN — SODIUM CHLORIDE 1000 ML: 9 INJECTION, SOLUTION INTRAVENOUS at 04:04

## 2023-04-20 RX ADMIN — KETOROLAC TROMETHAMINE 30 MG: 30 INJECTION, SOLUTION INTRAMUSCULAR at 04:04

## 2023-04-20 NOTE — ASSESSMENT & PLAN NOTE
History of HTN with documented diastolic BP >105 as far back as 2021. Patient is known to be noncompliant with BP treatment. Will monitor BP Q 4 hours and treat accordingly.  -Hydralazine 10 mg IVP Q 4 hours PRN for DBP> 105

## 2023-04-20 NOTE — Clinical Note
Diagnosis: UTI (urinary tract infection) [217027]   Admitting Provider:: LAURITA HENLEY JR [[id number]]   Future Attending Provider: YOMI STRANGE [[id number]]   Requested Bed Type: Standard [1]   Reason for IP Medical Treatment  (Clinical interventions that can only be accomplished in the IP setting? ) :: IV antibiotics, IV fluids and lab monitoring   I certify that Inpatient services for greater than or equal to 2 midnights are medically necessary:: Yes   Plans for Post-Acute care--if anticipated (pick the single best option):: A. No post acute care anticipated at this time   Special Needs:: No Special Needs [1]

## 2023-04-20 NOTE — SUBJECTIVE & OBJECTIVE
Past Medical History:   Diagnosis Date    Hypertension        Past Surgical History:   Procedure Laterality Date    ADENOIDECTOMY      TUBAL LIGATION         Review of patient's allergies indicates:   Allergen Reactions    Darvocet a500 [propoxyphene n-acetaminophen]        No current facility-administered medications on file prior to encounter.     Current Outpatient Medications on File Prior to Encounter   Medication Sig    ondansetron (ZOFRAN) 4 MG tablet Take 1 tablet (4 mg total) by mouth every 6 (six) hours.    potassium chloride SA (K-DUR,KLOR-CON) 20 MEQ tablet Take 1 tablet (20 mEq total) by mouth 2 (two) times daily. for 7 days    promethazine (PHENERGAN) 25 MG tablet Take 1 tablet (25 mg total) by mouth every 6 (six) hours as needed for Nausea.     Family History       Problem Relation (Age of Onset)    Cancer Mother          Tobacco Use    Smoking status: Never    Smokeless tobacco: Never   Substance and Sexual Activity    Alcohol use: Not Currently    Drug use: Never    Sexual activity: Yes     Review of Systems   Constitutional:  Positive for activity change (decreased), appetite change (decreased), chills, fatigue and fever.   HENT: Negative.     Eyes: Negative.    Respiratory: Negative.     Cardiovascular: Negative.    Gastrointestinal:  Positive for abdominal pain (Suprapubic), nausea and vomiting. Negative for abdominal distention, anal bleeding, blood in stool, constipation, diarrhea and rectal pain.   Endocrine: Negative.    Genitourinary:  Positive for decreased urine volume, difficulty urinating, dysuria, enuresis and vaginal bleeding (States that she started her cycle today 04/20/2023). Negative for flank pain, frequency, genital sores, hematuria, menstrual problem, pelvic pain and urgency.   Musculoskeletal:  Positive for myalgias.   Skin: Negative.    Allergic/Immunologic: Negative.    Neurological: Negative.    Hematological: Negative.    Psychiatric/Behavioral: Negative.     Objective:      Vital Signs (Most Recent):  Temp: 98.8 °F (37.1 °C) (04/20/23 1340)  Pulse: 101 (04/20/23 1715)  Resp: 14 (04/20/23 1715)  BP: (!) 169/96 (04/20/23 1715)  SpO2: 98 % (04/20/23 1715)   Vital Signs (24h Range):  Temp:  [98.8 °F (37.1 °C)] 98.8 °F (37.1 °C)  Pulse:  [] 101  Resp:  [14-18] 14  SpO2:  [98 %-99 %] 98 %  BP: (158-188)/() 169/96     Weight: 75.3 kg (166 lb)  Body mass index is 31.37 kg/m².    Physical Exam  Vitals and nursing note reviewed. Exam conducted with a chaperone present.   Constitutional:       General: She is not in acute distress.     Appearance: Normal appearance. She is normal weight. She is ill-appearing. She is not toxic-appearing or diaphoretic.   HENT:      Head: Normocephalic.      Right Ear: External ear normal.      Left Ear: External ear normal.      Nose: Nose normal.      Mouth/Throat:      Mouth: Mucous membranes are moist.      Pharynx: Oropharynx is clear.   Eyes:      Conjunctiva/sclera: Conjunctivae normal.      Pupils: Pupils are equal, round, and reactive to light.   Cardiovascular:      Rate and Rhythm: Regular rhythm. Tachycardia present.      Pulses: Normal pulses.      Heart sounds: Normal heart sounds.   Pulmonary:      Effort: Pulmonary effort is normal.      Breath sounds: Normal breath sounds.   Abdominal:      General: Bowel sounds are normal. There is no distension.      Palpations: There is no mass.      Tenderness: There is no abdominal tenderness. There is no right CVA tenderness, left CVA tenderness, guarding or rebound.      Hernia: No hernia is present.   Musculoskeletal:         General: Normal range of motion.      Cervical back: Normal range of motion.   Skin:     General: Skin is warm and dry.      Capillary Refill: Capillary refill takes less than 2 seconds.   Neurological:      General: No focal deficit present.      Mental Status: She is alert and oriented to person, place, and time. Mental status is at baseline.      Motor: Weakness  (Generalized) present.   Psychiatric:         Mood and Affect: Mood normal.         Behavior: Behavior normal.         Thought Content: Thought content normal.         Judgment: Judgment normal.         CRANIAL NERVES     CN III, IV, VI   Pupils are equal, round, and reactive to light.     Significant Labs: All pertinent labs within the past 24 hours have been reviewed.    Significant Imaging: I have reviewed all pertinent imaging results/findings within the past 24 hours.

## 2023-04-20 NOTE — HPI
42 y-old  female with PMH of HTN and medical noncompliance who presents to the ED with c/o nausea that has been unresponsive to Zofran/Phenergan. Patient was seen at [facility] X2 on 04/18/2023 with c/o N/V/D and was diagnosed with gastroenteritis, HTN and hypokalemia. She was treated with IVF, multiple K+ infusions, Zofran, Phenergan, Hydralazine and labetalol. Patient reached maximum benefit at that time and was discharged with prescriptions for Phenergan/Zofran/Potassium and  instructions to f/u with her PCP on the following day 04/19/2023. Patient states that she could not afford her Potassium RX so she only filled her Phenergan and did not f/u with her PCP as discussed. Presents to the ED today 04/20/2023 with complaint of intractable nausea and decreased urine output. Denies any diarrhea. Work up reveals a WBC of 23.23, Lactic acid of 2.2, K+ of 2.8, UA that is Nitrite, leukocyte and blood positive. Patient was treated in the ED with IVF, K+ 20 meq, Rocephin 1 gram, Toradol 30 mg and hydralazine. Patient is to be admitted to [facility] for IVF, antiemetics, supplemental K+ and Rocephin to treat her UTI/possible Pyelo.

## 2023-04-20 NOTE — ED TRIAGE NOTES
Reports was seen in the ED on Tuesday and was told she had gastroenteritis.  States the phenergan is not working.  She can not eat and can only drink small amounts of fluids.  If she drinks too much she vomits.  Constantly nauseated.  Reports low back pain and low abdominal pain.  Not peeing much and when she does it is dark

## 2023-04-20 NOTE — ED PROVIDER NOTES
Encounter Date: 4/20/2023       History     Chief Complaint   Patient presents with    Nausea    Back Pain    Abdominal Pain     43 y/o WF presents to the ED with complaint of nausea and vomiting that started on Tuesday. Has been taking Phenergan, but states is not helping with nausea and vomiting. Last vomited this am. Denies any diarrhea. Reports having lower abdominal pain that started today, but started menstrual cycle just PTA. Having back pain that started on 04/15/2023. Denies injury. Having urinary frequency and voiding small amount. Denies fever, pain or burning with urination.   She was seen x 2 on Tuesday here in the ED for same symptoms. She was diagnosed with gastroenteritis, hypokalemia and HTN. She received IV fluids, Zofran IV, Phenergan IV and replaced potassium. She was discharged home with prescription for phenergan and potassium chl. She picked up phenergan, but did not get potassium prescription. States she did not have the money to  prescription.     The history is provided by the patient.   Review of patient's allergies indicates:   Allergen Reactions    Darvocet a500 [propoxyphene n-acetaminophen]      Past Medical History:   Diagnosis Date    Hypertension      Past Surgical History:   Procedure Laterality Date    ADENOIDECTOMY      TUBAL LIGATION       Family History   Problem Relation Age of Onset    Cancer Mother      Social History     Tobacco Use    Smoking status: Never    Smokeless tobacco: Never   Substance Use Topics    Alcohol use: Not Currently    Drug use: Never     Review of Systems   Constitutional:  Positive for activity change (decreased), appetite change (decreased) and fatigue. Negative for fever.   HENT: Negative.  Negative for congestion, ear pain, sore throat and trouble swallowing.    Eyes: Negative.  Negative for pain and visual disturbance.   Respiratory: Negative.  Negative for cough, chest tightness and shortness of breath.    Cardiovascular:  Negative.  Negative for chest pain and leg swelling.   Gastrointestinal:  Positive for abdominal pain, nausea and vomiting. Negative for constipation and diarrhea.   Genitourinary:  Positive for decreased urine volume and frequency. Negative for difficulty urinating, dysuria, vaginal discharge and vaginal pain.   Musculoskeletal:  Positive for back pain and myalgias. Negative for arthralgias, gait problem, neck pain and neck stiffness.   Skin: Negative.  Negative for rash.   Neurological:  Positive for weakness (generalized weakness) and headaches. Negative for dizziness and light-headedness.   Hematological:  Negative for adenopathy.   Psychiatric/Behavioral:  Negative for agitation and behavioral problems. The patient is not nervous/anxious.      Physical Exam     Initial Vitals [04/20/23 1340]   BP Pulse Resp Temp SpO2   (!) 158/124 (!) 120 18 98.8 °F (37.1 °C) 98 %      MAP       --         Physical Exam    Nursing note and vitals reviewed.  Constitutional: She appears well-developed and well-nourished. She is cooperative.  Non-toxic appearance. She does not have a sickly appearance. She appears ill.   HENT:   Head: Normocephalic and atraumatic.   Right Ear: Tympanic membrane, external ear and ear canal normal.   Left Ear: Tympanic membrane, external ear and ear canal normal.   Nose: Nose normal.   Mouth/Throat: Oropharynx is clear and moist. Mucous membranes are dry.   Eyes: Conjunctivae and EOM are normal. Pupils are equal, round, and reactive to light.   Neck: Neck supple.   Normal range of motion.  Cardiovascular:  Regular rhythm, normal heart sounds, intact distal pulses and normal pulses.   Tachycardia present.         No edema to BLE   Pulmonary/Chest: Effort normal and breath sounds normal. She has no decreased breath sounds. She has no wheezes. She has no rhonchi. She has no rales.   Abdominal: Abdomen is soft. Bowel sounds are normal. There is abdominal tenderness in the right lower quadrant,  suprapubic area and left lower quadrant.   No right CVA tenderness.  No left CVA tenderness. There is no rebound and no guarding.   Musculoskeletal:      Cervical back: Normal range of motion and neck supple.      Thoracic back: Tenderness present. No swelling, edema, deformity, signs of trauma, spasms or bony tenderness. Normal range of motion.      Lumbar back: Tenderness present. No swelling, edema, deformity, signs of trauma, spasms or bony tenderness. Normal range of motion. Negative right straight leg raise test and negative left straight leg raise test.     Lymphadenopathy:     She has no cervical adenopathy.   Neurological: She is alert and oriented to person, place, and time. She has normal strength. No cranial nerve deficit or sensory deficit. Coordination and gait normal. GCS eye subscore is 4. GCS verbal subscore is 5. GCS motor subscore is 6.       Medical Screening Exam   See Full Note    ED Course   Procedures  Labs Reviewed   COMPREHENSIVE METABOLIC PANEL - Abnormal; Notable for the following components:       Result Value    Sodium 135 (*)     Potassium 2.8 (*)     Chloride 96 (*)     Anion Gap 19 (*)     Glucose 167 (*)     BUN 27 (*)     Creatinine 1.27 (*)     BUN/Creatinine Ratio 21 (*)     Total Protein 8.7 (*)     eGFR 55 (*)     All other components within normal limits   CBC WITH DIFFERENTIAL - Abnormal; Notable for the following components:    WBC 23.25 (*)     RBC 5.58 (*)     MCV 74.4 (*)     MCH 24.9 (*)     RDW 16.5 (*)     Platelet Count 414 (*)     Neutrophils % 75.6 (*)     Lymphocytes % 16.9 (*)     Neutrophils, Abs 17.56 (*)     Monocytes % 7.3 (*)     Eosinophils % 0.0 (*)     Monocytes, Absolute 1.69 (*)     All other components within normal limits   URINALYSIS - Abnormal; Notable for the following components:    Leukocytes, UA Trace (*)     Nitrites, UA Positive (*)     Protein, UA >=300 (*)     Bilirubin, UA Small (*)     Blood, UA Large (*)     Specific Gravity, UA >=1.030  (*)     All other components within normal limits   LACTIC ACID, PLASMA - Abnormal; Notable for the following components:    Lactic Acid 2.2 (*)     All other components within normal limits   MANUAL DIFFERENTIAL - Abnormal; Notable for the following components:    Segmented Neutrophils, Man % 78 (*)     Lymphocytes, Man % 14 (*)     Monocytes, Man % 7 (*)     All other components within normal limits   HCG QUALITATIVE URINE - Normal   MAGNESIUM - Normal   LIPASE - Normal   SARS-COV2 (COVID) W/ FLU ANTIGEN - Normal    Narrative:     Negative SARS-CoV results should not be used as the sole basis for treatment or patient management decisions; negative results should be considered in the context of a patient's recent exposures, history and the presene of clinical signs and symptoms consistent with COVID-19.  Negative results should be treated as presumptive and confirmed by molecular assay, if necessary for patient management.   CULTURE, BLOOD   CULTURE, BLOOD   CBC W/ AUTO DIFFERENTIAL    Narrative:     The following orders were created for panel order CBC auto differential.  Procedure                               Abnormality         Status                     ---------                               -----------         ------                     CBC with Differential[[id number]]        Abnormal            Final result               Manual Differential[[id number]]          Abnormal            Final result                 Please view results for these tests on the individual orders.   DRUG SCREEN, URINE (BEAKER)   LACTIC ACID, PLASMA        ECG Results              EKG 12-lead (In process)  Result time 04/20/23 15:35:59      In process by Interface, Lab In [facility] (04/20/23 15:35:59)                   Narrative:    Test Reason : R00.0,    Vent. Rate : 104 BPM     Atrial Rate : 104 BPM     P-R Int : 132 ms          QRS Dur : 086 ms      QT Int : 376 ms       P-R-T Axes : 071 005 015 degrees     QTc Int : 494 ms    Sinus  tachycardia  Biatrial enlargement  Anterior infarct ,age undetermined  T wave abnormality, consider inferior ischemia  Abnormal ECG  No previous ECGs available    Referred By: [email or web address]   SELF           Confirmed By:                                   Imaging Results              X-Ray KUB (In process)                      X-Ray Chest AP Portable (Final result)  Result time 04/20/23 16:22:01      Final result by Henry Everett DO (04/20/23 16:22:01)                   Impression:      No acute cardiopulmonary process demonstrated.    Point of Service: [facility]      Electronically signed by: Henry Everett  Date:    04/20/2023  Time:    16:22               Narrative:    EXAMINATION:  XR CHEST AP PORTABLE    CLINICAL HISTORY:  leukocytosis;    COMPARISON:  None    TECHNIQUE:  Frontal view/views of the chest.    FINDINGS:  Heart size appears within normal limits.  No focal consolidation, pleural effusion, or pneumothorax.  Visualized osseous and surrounding soft tissue structures demonstrate no acute abnormality.                                       Medications   cefTRIAXone (ROCEPHIN) 1 g in dextrose 5 % in water (D5W) 5 % 50 mL IVPB (MB+) (1 g Intravenous New Bag 4/20/23 1705)   sodium chloride 0.9% bolus 1,000 mL 1,000 mL (1,000 mLs Intravenous New Bag 4/20/23 1538)   ondansetron injection 4 mg (4 mg Intravenous Given 4/20/23 1538)   potassium chloride 10 mEq in 100 mL IVPB (20 mEq Intravenous New Bag 4/20/23 1612)   ketorolac injection 30 mg (30 mg Intravenous Given 4/20/23 1612)   hydrALAZINE injection 20 mg (20 mg Intravenous Given 4/20/23 1639)   0.9%  NaCl infusion (1,000 mLs Intravenous New Bag 4/20/23 1643)     Medical Decision Making:   Clinical Tests:   Lab Tests: Ordered and Reviewed  The following lab test(s) were unremarkable: CBC, CMP, Lactate, Urinalysis, UPT and Lipase  Radiological Study: Ordered and Reviewed  Medical Tests: Ordered and Reviewed  ED Management:  MDM:  Patient presents  for emergent evaluation of acute nausea with vomiting and lower abdominal pain that poses a possible threat to life and/or bodily function.    In the ED patient found to have normal vital signs. BS + x 4 quads. Soft mild lower abdominal tenderness with palpation. No rebound, guarding or CVA tenderness noted.  I ordered labs and personally reviewed them.  Labs significant for WBC of 23.25, , K+ 2.8, BUN/CR 27/1.27 which is up from 18/1.09 baseline, Mag 2.0, Lactic 2.0, UA: + nitrite and leukocytes on dip.   I ordered X-rays and personally reviewed them and reviewed the radiologist interpretation.  CXR: No acute cardiopulmonary process. KUB significant for negative for acute findings.    I ordered EKG and personally reviewed it.  EKG significant for sinus tach at 102 bpm.      Admission MDM  I discussed the patient presentation labs, ekg, and x-ray results with Dr. Walsh. Discussed all results with patient and need for admission.   Patient was managed in the ED with IV NS bolus, Potassium Chl 20 meq rider, Zofran 4 mg IV x1, Toradol 30 mg IV x1, Hydralazine 10 mg IV x 1 and Rocephin 1 gm IV x 1.  BP improved to 169/96. Will admit to floor for continuation of care.    Patient required emergent consultation to Dr. Walsh for admission to [facility] for in patient treatment. Medication reconciliation done.                           Clinical Impression:   Final diagnoses:  [R00.0] Tachycardia  [N39.0] Urinary tract infection without hematuria, site unspecified  [R11.2] Intractable nausea and vomiting (Primary)  [E87.6] Hypokalemia  [N17.9] Acute renal failure, unspecified acute renal failure type  [R10.9] Abdominal pain, unspecified abdominal location  [N39.0] UTI (urinary tract infection)        ED Disposition Condition    Admit Stable                IRAIDA Musa  04/20/23 1711       IRAIDA Musa  04/22/23 0535

## 2023-04-20 NOTE — ASSESSMENT & PLAN NOTE
Treat with antiemetics   -Zofran 4 mg IV Q 6 hours PRN 1st choice  -Phenergan 25 mg IVPB Q 6 hours PRN 2nd choice.

## 2023-04-20 NOTE — H&P
[facility] - Medical Surgical Unit  Hospital Medicine  History & Physical    Patient Name: Blanca Campoverde  MRN: [id number]  Patient Class: IP- Inpatient  Admission Date: 4/20/2023  Attending Physician: Hipolito Vázquez DO   Primary Care Provider: Khanh Trinh MD         Patient information was obtained from patient, past medical records and ER records.     Subjective:     Principal Problem:Urinary tract infection without hematuria    Chief Complaint:   Chief Complaint   Patient presents with    Nausea    Back Pain    Abdominal Pain        HPI: 42 y-old  female with PMH of HTN and medical noncompliance who presents to the ED with c/o nausea that has been unresponsive to Zofran/Phenergan. Patient was seen at [facility] X2 on 04/18/2023 with c/o N/V/D and was diagnosed with gastroenteritis, HTN and hypokalemia. She was treated with IVF, multiple K+ infusions, Zofran, Phenergan, Hydralazine and labetalol. Patient reached maximum benefit at that time and was discharged with prescriptions for Phenergan/Zofran/Potassium and  instructions to f/u with her PCP on the following day 04/19/2023. Patient states that she could not afford her Potassium RX so she only filled her Phenergan and did not f/u with her PCP as discussed. Presents to the ED today 04/20/2023 with complaint of intractable nausea and decreased urine output. Denies any diarrhea. Work up reveals a WBC of 23.23, Lactic acid of 2.2, K+ of 2.8, UA that is Nitrite, leukocyte and blood positive. Patient was treated in the ED with IVF, K+ 20 meq, Rocephin 1 gram, Toradol 30 mg and hydralazine. Patient is to be admitted to [facility] for IVF, antiemetics, supplemental K+ and Rocephin to treat her UTI/possible Pyelo.       Past Medical History:   Diagnosis Date    Hypertension        Past Surgical History:   Procedure Laterality Date    ADENOIDECTOMY      TUBAL LIGATION         Review of patient's allergies indicates:   Allergen  Reactions    Darvocet a500 [propoxyphene n-acetaminophen]        No current facility-administered medications on file prior to encounter.     Current Outpatient Medications on File Prior to Encounter   Medication Sig    ondansetron (ZOFRAN) 4 MG tablet Take 1 tablet (4 mg total) by mouth every 6 (six) hours.    potassium chloride SA (K-DUR,KLOR-CON) 20 MEQ tablet Take 1 tablet (20 mEq total) by mouth 2 (two) times daily. for 7 days    promethazine (PHENERGAN) 25 MG tablet Take 1 tablet (25 mg total) by mouth every 6 (six) hours as needed for Nausea.     Family History       Problem Relation (Age of Onset)    Cancer Mother          Tobacco Use    Smoking status: Never    Smokeless tobacco: Never   Substance and Sexual Activity    Alcohol use: Not Currently    Drug use: Never    Sexual activity: Yes     Review of Systems   Constitutional:  Positive for activity change (decreased), appetite change (decreased), chills, fatigue and fever.   HENT: Negative.     Eyes: Negative.    Respiratory: Negative.     Cardiovascular: Negative.    Gastrointestinal:  Positive for abdominal pain (Suprapubic), nausea and vomiting. Negative for abdominal distention, anal bleeding, blood in stool, constipation, diarrhea and rectal pain.   Endocrine: Negative.    Genitourinary:  Positive for decreased urine volume, difficulty urinating, dysuria, enuresis and vaginal bleeding (States that she started her cycle today 04/20/2023). Negative for flank pain, frequency, genital sores, hematuria, menstrual problem, pelvic pain and urgency.   Musculoskeletal:  Positive for myalgias.   Skin: Negative.    Allergic/Immunologic: Negative.    Neurological: Negative.    Hematological: Negative.    Psychiatric/Behavioral: Negative.     Objective:     Vital Signs (Most Recent):  Temp: 98.8 °F (37.1 °C) (04/20/23 1340)  Pulse: 101 (04/20/23 1715)  Resp: 14 (04/20/23 1715)  BP: (!) 169/96 (04/20/23 1715)  SpO2: 98 % (04/20/23 1715)   Vital Signs (24h  Range):  Temp:  [98.8 °F (37.1 °C)] 98.8 °F (37.1 °C)  Pulse:  [] 101  Resp:  [14-18] 14  SpO2:  [98 %-99 %] 98 %  BP: (158-188)/() 169/96     Weight: 75.3 kg (166 lb)  Body mass index is 31.37 kg/m².    Physical Exam  Vitals and nursing note reviewed. Exam conducted with a chaperone present.   Constitutional:       General: She is not in acute distress.     Appearance: Normal appearance. She is normal weight. She is ill-appearing. She is not toxic-appearing or diaphoretic.   HENT:      Head: Normocephalic.      Right Ear: External ear normal.      Left Ear: External ear normal.      Nose: Nose normal.      Mouth/Throat:      Mouth: Mucous membranes are moist.      Pharynx: Oropharynx is clear.   Eyes:      Conjunctiva/sclera: Conjunctivae normal.      Pupils: Pupils are equal, round, and reactive to light.   Cardiovascular:      Rate and Rhythm: Regular rhythm. Tachycardia present.      Pulses: Normal pulses.      Heart sounds: Normal heart sounds.   Pulmonary:      Effort: Pulmonary effort is normal.      Breath sounds: Normal breath sounds.   Abdominal:      General: Bowel sounds are normal. There is no distension.      Palpations: There is no mass.      Tenderness: There is no abdominal tenderness. There is no right CVA tenderness, left CVA tenderness, guarding or rebound.      Hernia: No hernia is present.   Musculoskeletal:         General: Normal range of motion.      Cervical back: Normal range of motion.   Skin:     General: Skin is warm and dry.      Capillary Refill: Capillary refill takes less than 2 seconds.   Neurological:      General: No focal deficit present.      Mental Status: She is alert and oriented to person, place, and time. Mental status is at baseline.      Motor: Weakness (Generalized) present.   Psychiatric:         Mood and Affect: Mood normal.         Behavior: Behavior normal.         Thought Content: Thought content normal.         Judgment: Judgment normal.          CRANIAL NERVES     CN III, IV, VI   Pupils are equal, round, and reactive to light.     Significant Labs: All pertinent labs within the past 24 hours have been reviewed.    Significant Imaging: I have reviewed all pertinent imaging results/findings within the past 24 hours.    Assessment/Plan:     * Urinary tract infection without hematuria  Urine culture pending  -Rocephin 1 gm IVPB Q 24 hours      Nausea  Treat with antiemetics   -Zofran 4 mg IV Q 6 hours PRN 1st choice  -Phenergan 25 mg IVPB Q 6 hours PRN 2nd choice.       HTN (hypertension)  History of HTN with documented diastolic BP >105 as far back as 2021. Patient is known to be noncompliant with BP treatment. Will monitor BP Q 4 hours and treat accordingly.  -Hydralazine 10 mg IVP Q 4 hours PRN for DBP> 105      Hypokalemia  Patient received 20 meq IV while in the ED  -0.45% NS with 20 meq KCl to infuse at 100 ml/hr  -Repeat BMP in AM        VTE Risk Mitigation (From admission, onward)         Ordered     IP VTE HIGH RISK PATIENT  Once         04/20/23 1755     Place sequential compression device  Until discontinued         04/20/23 1755               Patients presentation, history, treatment, Medication reconciliation, and POC were discussed with Dr. Walsh who is the hospitalist on-call at [facility].             HUMBERTO Hoff  Department of Hospital Medicine  [facility] - Medical Surgical Unit

## 2023-04-20 NOTE — ASSESSMENT & PLAN NOTE
Patient received 20 meq IV while in the ED  -0.45% NS with 20 meq KCl to infuse at 100 ml/hr  -Repeat BMP in AM

## 2023-04-20 NOTE — PLAN OF CARE
Care plan reviewed  Problem: Infection  Goal: Absence of Infection Signs and Symptoms  Outcome: Ongoing, Progressing     Problem: Adult Inpatient Plan of Care  Goal: Plan of Care Review  Outcome: Ongoing, Progressing  Goal: Patient-Specific Goal (Individualized)  Outcome: Ongoing, Progressing  Goal: Absence of Hospital-Acquired Illness or Injury  Outcome: Ongoing, Progressing  Goal: Optimal Comfort and Wellbeing  Outcome: Ongoing, Progressing  Goal: Readiness for Transition of Care  Outcome: Ongoing, Progressing     Problem: Pain Acute  Goal: Acceptable Pain Control and Functional Ability  Outcome: Ongoing, Progressing

## 2023-04-21 LAB
ANION GAP SERPL CALCULATED.3IONS-SCNC: 16 MMOL/L (ref 7–16)
BASOPHILS # BLD AUTO: 0.08 K/UL (ref 0–0.2)
BASOPHILS NFR BLD AUTO: 0.4 % (ref 0–1)
BUN SERPL-MCNC: 21 MG/DL (ref 7–18)
BUN/CREAT SERPL: 24 (ref 6–20)
CALCIUM SERPL-MCNC: 8.4 MG/DL (ref 8.5–10.1)
CHLORIDE SERPL-SCNC: 100 MMOL/L (ref 98–107)
CO2 SERPL-SCNC: 23 MMOL/L (ref 21–32)
CREAT SERPL-MCNC: 0.88 MG/DL (ref 0.55–1.02)
DIFFERENTIAL METHOD BLD: ABNORMAL
EGFR (NO RACE VARIABLE) (RUSH/TITUS): 85 ML/MIN/1.73M²
EOSINOPHIL # BLD AUTO: 0.02 K/UL (ref 0–0.5)
EOSINOPHIL NFR BLD AUTO: 0.1 % (ref 1–4)
ERYTHROCYTE [DISTWIDTH] IN BLOOD BY AUTOMATED COUNT: 16.4 % (ref 11.5–14.5)
GLUCOSE SERPL-MCNC: 146 MG/DL (ref 74–106)
HCT VFR BLD AUTO: 37 % (ref 38–47)
HGB BLD-MCNC: 12.2 G/DL (ref 12–16)
LYMPHOCYTES # BLD AUTO: 4.04 K/UL (ref 1–4.8)
LYMPHOCYTES NFR BLD AUTO: 20.7 % (ref 27–41)
MCH RBC QN AUTO: 25.2 PG (ref 27–31)
MCHC RBC AUTO-ENTMCNC: 33 G/DL (ref 32–36)
MCV RBC AUTO: 76.3 FL (ref 80–96)
MONOCYTES # BLD AUTO: 1.51 K/UL (ref 0–0.8)
MONOCYTES NFR BLD AUTO: 7.7 % (ref 2–6)
MPC BLD CALC-MCNC: 9.6 FL (ref 9.4–12.4)
NEUTROPHILS # BLD AUTO: 13.89 K/UL (ref 1.8–7.7)
NEUTROPHILS NFR BLD AUTO: 71.1 % (ref 53–65)
PLATELET # BLD AUTO: 358 K/UL (ref 150–400)
POTASSIUM SERPL-SCNC: 3 MMOL/L (ref 3.5–5.1)
RBC # BLD AUTO: 4.85 M/UL (ref 4.2–5.4)
SODIUM SERPL-SCNC: 136 MMOL/L (ref 136–145)
WBC # BLD AUTO: 19.54 K/UL (ref 4.5–11)

## 2023-04-21 PROCEDURE — 85025 COMPLETE CBC W/AUTO DIFF WBC: CPT | Performed by: REGISTERED NURSE

## 2023-04-21 PROCEDURE — 63600175 PHARM REV CODE 636 W HCPCS: Performed by: REGISTERED NURSE

## 2023-04-21 PROCEDURE — 25000003 PHARM REV CODE 250: Performed by: REGISTERED NURSE

## 2023-04-21 PROCEDURE — 99232 SBSQ HOSP IP/OBS MODERATE 35: CPT | Mod: ,,, | Performed by: NURSE PRACTITIONER

## 2023-04-21 PROCEDURE — 80048 BASIC METABOLIC PNL TOTAL CA: CPT | Performed by: REGISTERED NURSE

## 2023-04-21 PROCEDURE — 94761 N-INVAS EAR/PLS OXIMETRY MLT: CPT

## 2023-04-21 PROCEDURE — 11000001 HC ACUTE MED/SURG PRIVATE ROOM

## 2023-04-21 PROCEDURE — 63600175 PHARM REV CODE 636 W HCPCS: Performed by: NURSE PRACTITIONER

## 2023-04-21 PROCEDURE — 25000003 PHARM REV CODE 250: Performed by: NURSE PRACTITIONER

## 2023-04-21 PROCEDURE — 99232 PR SUBSEQUENT HOSPITAL CARE,LEVL II: ICD-10-PCS | Mod: ,,, | Performed by: NURSE PRACTITIONER

## 2023-04-21 RX ORDER — POTASSIUM CHLORIDE 20 MEQ/1
40 TABLET, EXTENDED RELEASE ORAL ONCE
Status: COMPLETED | OUTPATIENT
Start: 2023-04-21 | End: 2023-04-21

## 2023-04-21 RX ORDER — HYDRALAZINE HYDROCHLORIDE 20 MG/ML
10 INJECTION INTRAMUSCULAR; INTRAVENOUS ONCE
Status: COMPLETED | OUTPATIENT
Start: 2023-04-21 | End: 2023-04-21

## 2023-04-21 RX ORDER — HYDROMORPHONE HYDROCHLORIDE 2 MG/ML
0.5 INJECTION, SOLUTION INTRAMUSCULAR; INTRAVENOUS; SUBCUTANEOUS ONCE
Status: COMPLETED | OUTPATIENT
Start: 2023-04-21 | End: 2023-04-21

## 2023-04-21 RX ORDER — POTASSIUM CHLORIDE 20 MEQ/1
20 TABLET, EXTENDED RELEASE ORAL 2 TIMES DAILY
Status: DISCONTINUED | OUTPATIENT
Start: 2023-04-21 | End: 2023-04-21

## 2023-04-21 RX ORDER — METOPROLOL SUCCINATE 25 MG/1
25 TABLET, EXTENDED RELEASE ORAL DAILY
Status: DISCONTINUED | OUTPATIENT
Start: 2023-04-21 | End: 2023-04-23 | Stop reason: HOSPADM

## 2023-04-21 RX ADMIN — HYDRALAZINE HYDROCHLORIDE 10 MG: 20 INJECTION INTRAMUSCULAR; INTRAVENOUS at 04:04

## 2023-04-21 RX ADMIN — HYDRALAZINE HYDROCHLORIDE 10 MG: 20 INJECTION INTRAMUSCULAR; INTRAVENOUS at 08:04

## 2023-04-21 RX ADMIN — PROMETHAZINE HYDROCHLORIDE 25 MG: 25 INJECTION INTRAMUSCULAR; INTRAVENOUS at 06:04

## 2023-04-21 RX ADMIN — HYDROMORPHONE HYDROCHLORIDE 0.5 MG: 2 INJECTION, SOLUTION INTRAMUSCULAR; INTRAVENOUS; SUBCUTANEOUS at 03:04

## 2023-04-21 RX ADMIN — POTASSIUM CHLORIDE AND SODIUM CHLORIDE 100 ML/HR: 450; 150 INJECTION, SOLUTION INTRAVENOUS at 07:04

## 2023-04-21 RX ADMIN — HYDROCODONE BITARTRATE AND ACETAMINOPHEN 1 TABLET: 7.5; 325 TABLET ORAL at 08:04

## 2023-04-21 RX ADMIN — CEFTRIAXONE 1 G: 1 INJECTION, POWDER, FOR SOLUTION INTRAMUSCULAR; INTRAVENOUS at 04:04

## 2023-04-21 RX ADMIN — ONDANSETRON 4 MG: 2 INJECTION INTRAMUSCULAR; INTRAVENOUS at 04:04

## 2023-04-21 RX ADMIN — METOPROLOL SUCCINATE 25 MG: 25 TABLET, EXTENDED RELEASE ORAL at 10:04

## 2023-04-21 RX ADMIN — POTASSIUM CHLORIDE 40 MEQ: 1500 TABLET, EXTENDED RELEASE ORAL at 10:04

## 2023-04-21 RX ADMIN — HYDRALAZINE HYDROCHLORIDE 10 MG: 20 INJECTION INTRAMUSCULAR; INTRAVENOUS at 12:04

## 2023-04-21 RX ADMIN — HYDRALAZINE HYDROCHLORIDE 10 MG: 20 INJECTION INTRAMUSCULAR; INTRAVENOUS at 03:04

## 2023-04-21 RX ADMIN — POTASSIUM CHLORIDE AND SODIUM CHLORIDE 100 ML/HR: 450; 150 INJECTION, SOLUTION INTRAVENOUS at 04:04

## 2023-04-21 RX ADMIN — HYDROMORPHONE HYDROCHLORIDE 0.5 MG: 2 INJECTION, SOLUTION INTRAMUSCULAR; INTRAVENOUS; SUBCUTANEOUS at 09:04

## 2023-04-21 NOTE — PLAN OF CARE
[facility] - Medical Surgical Unit  Initial Discharge Assessment       Primary Care Provider: Khanh Trinh MD    Admission Diagnosis: Hypokalemia [E87.6]  UTI (urinary tract infection) [N39.0]  Tachycardia [R00.0]  Intractable nausea and vomiting [R11.2]  Abdominal pain, unspecified abdominal location [R10.9]  Urinary tract infection without hematuria, site unspecified [N39.0]  Acute renal failure, unspecified acute renal failure type [N17.9]    Admission Date: 4/20/2023  Expected Discharge Date:          Payor: /     Extended Emergency Contact Information  Primary Emergency Contact: antolin boone  Mobile Phone: [phone]  Relation: Daughter  Preferred language: English   needed? No  Secondary Emergency Contact: zuri lundberg  Mobile Phone: [phone]  Relation: Significant other    Discharge Plan A: Home  Discharge Plan B: Home      [facility] - [place], [place] - [address]  [address]  [place] [address]  Phone: [phone] Fax: [phone]    [facility], [place] - [address]  [facility] [place] [address]  Phone: [phone] Fax: [phone]      Initial Assessment (most recent)       Adult Discharge Assessment - 04/21/23 0949          Discharge Assessment    Assessment Type Discharge Planning Assessment     Source of Information patient     Reason For Admission UTI     People in Home significant other   Several people in home    Do you expect to return to your current living situation? Yes     Do you have help at home or someone to help you manage your care at home? Yes     Who are your caregiver(s) and their phone number(s)? racheal Jin other, [phone]     Current cognitive status: Alert/Oriented     Home Accessibility wheelchair accessible     Home Layout Able to live on 1st floor     Equipment Currently Used at Home none     Readmission within 30 days? No     Patient currently being followed by outpatient case  management? No     Do you currently have service(s) that help you manage your care at home? No     Do you take prescription medications? Yes     Do you have prescription coverage? No     Do you have any problems affording any of your prescribed medications? No     Is the patient taking medications as prescribed? yes     Who is going to help you get home at discharge? racheal Jin, [phone]     How do you get to doctors appointments? car, drives self;family or friend will provide     Are you on dialysis? No     Do you take coumadin? No     Discharge Plan A Home     Discharge Plan B Home     DME Needed Upon Discharge  none     Discharge Plan discussed with: POA        Physical Activity    On average, how many days per week do you engage in moderate to strenuous exercise (like a brisk walk)? 0 days     On average, how many minutes do you engage in exercise at this level? 0 min        Financial Resource Strain    How hard is it for you to pay for the very basics like food, housing, medical care, and heating? Not hard at all        Housing Stability    In the last 12 months, was there a time when you were not able to pay the mortgage or rent on time? No     In the last 12 months, how many places have you lived? 1     In the last 12 months, was there a time when you did not have a steady place to sleep or slept in a shelter (including now)? No        Transportation Needs    In the past 12 months, has lack of transportation kept you from medical appointments or from getting medications? No     In the past 12 months, has lack of transportation kept you from meetings, work, or from getting things needed for daily living? No        Food Insecurity    Within the past 12 months, you worried that your food would run out before you got the money to buy more. Never true     Within the past 12 months, the food you bought just didn't last and you didn't have money to get more. Never true        Stress    Do you feel stress -  tense, restless, nervous, or anxious, or unable to sleep at night because your mind is troubled all the time - these days? Not at all        Social Connections    In a typical week, how many times do you talk on the phone with family, friends, or neighbors? More than three times a week     How often do you get together with friends or relatives? More than three times a week     How often do you attend Bahai or Nondenominational services? Never     Do you belong to any clubs or organizations such as Bahai groups, unions, fraternal or athletic groups, or school groups? No     How often do you attend meetings of the clubs or organizations you belong to? Never     Are you , , , , never , or living with a partner?         Alcohol Use    Q1: How often do you have a drink containing alcohol? Never     Q2: How many drinks containing alcohol do you have on a typical day when you are drinking? Patient does not drink     Q3: How often do you have six or more drinks on one occasion? Never                 SS spoke with pt in room. Pt lives at home with sig other and others. No dme or hh pta. SS informed pt of free clinics in [facility], and [place]. Information sheet given all places. Pt informed ss that she has the money to get meds and go to dr. Pt informed ss that she had waited to get rx filled. SS following.

## 2023-04-21 NOTE — ASSESSMENT & PLAN NOTE
Patient received 20 meq IV while in the ED  -0.45% NS with 20 meq KCl to infuse at 100 ml/hr  -Repeat BMP in AM    04/21/2023: Potassium up to 3.0. Tolerating po fluids. Continues to receive 0.45% NS with 20 KCL at 100 ml/hr.   -Potassium Chl 40 meq po x 1 dose  -BMP in am

## 2023-04-21 NOTE — ASSESSMENT & PLAN NOTE
Treat with antiemetics   -Zofran 4 mg IV Q 6 hours PRN 1st choice  -Phenergan 25 mg IVPB Q 6 hours PRN 2nd choice.     04/21/2023: No nausea or vomiting today.   -Clear liquid diet, will advance or tolerated.

## 2023-04-21 NOTE — PROGRESS NOTES
[facility] - Medical Surgical Unit  Hospital Medicine  Progress Note    Patient Name: Blanca Campoverde  MRN: [id number]  Patient Class: IP- Inpatient   Admission Date: 4/20/2023  Length of Stay: 1 days  Attending Physician: Hipolito Vázquez DO  Primary Care Provider: Khanh Trinh MD        Subjective:     Principal Problem:Urinary tract infection without hematuria        HPI:  42 y-old  female with PMH of HTN and medical noncompliance who presents to the ED with c/o nausea that has been unresponsive to Zofran/Phenergan. Patient was seen at [facility] on 04/18/2023 with c/o N/V/D and was diagnosed with gastroenteritis, HTN and hypokalemia. She was treated with IVF, multiple K+ infusions, Zofran, Phenergan, Hydralazine and labetalol. Patient reached maximum benefit at that time and was discharged with prescriptions for Phenergan/Zofran/Potassium and  instructions to f/u with her PCP on the following day 04/19/2023. Patient states that she could not afford her Potassium RX so she only filled her Phenergan and did not f/u with her PCP as discussed. Presents to the ED today 04/20/2023 with complaint of intractable nausea and decreased urine output. Denies any diarrhea. Work up reveals a WBC of 23.23, Lactic acid of 2.2, K+ of 2.8, UA that is Nitrite, leukocyte and blood positive. Patient was treated in the ED with IVF, K+ 20 meq, Rocephin 1 gram, Toradol 30 mg and hydralazine. Patient is to be admitted to [facility] for IVF, antiemetics, supplemental K+ and Rocephin to treat her UTI/possible Pyelo.       Overview/Hospital Course:  04/21/2023: Patient is hospital day #2 receiving 0.45% NS with 20 KCL at 100 ml/hr. Has not had any nausea or vomiting since admission. Denies any abdominal pain at present. No new complaints. Labs today: WBC down to 19.54, K+ up to 3.0, BUN/CR improved to 21/0.88. Urine culture and blood cultures are pending.       Interval History: WBC down to 19.54, K+ up to  3.0, BUN/CR improved to 21/0.88    Review of Systems   Constitutional:  Positive for fatigue. Negative for activity change (decreased), appetite change (decreased), chills and fever.   HENT: Negative.     Eyes: Negative.    Respiratory: Negative.     Cardiovascular: Negative.    Gastrointestinal:  Positive for nausea. Negative for abdominal distention, abdominal pain (Suprapubic), anal bleeding, blood in stool, constipation, diarrhea, rectal pain and vomiting.   Endocrine: Negative.    Genitourinary:  Negative for decreased urine volume, difficulty urinating, dysuria, enuresis, flank pain, frequency, genital sores, hematuria, menstrual problem, pelvic pain, urgency and vaginal bleeding (States that she started her cycle today 04/20/2023).   Musculoskeletal:  Negative for myalgias.   Skin: Negative.  Negative for rash.   Allergic/Immunologic: Negative.    Neurological:  Positive for weakness (generalized). Negative for dizziness, light-headedness and headaches.   Hematological: Negative.    Psychiatric/Behavioral: Negative.     Objective:     Vital Signs (Most Recent):  Temp: 98.2 °F (36.8 °C) (04/21/23 1500)  Pulse: 106 (04/21/23 1749)  Resp: 18 (04/21/23 1500)  BP: (!) 185/110 (04/21/23 1749)  SpO2: 98 % (04/21/23 1526)   Vital Signs (24h Range):  Temp:  [98.2 °F (36.8 °C)-98.4 °F (36.9 °C)] 98.2 °F (36.8 °C)  Pulse:  [] 106  Resp:  [18-20] 18  SpO2:  [96 %-100 %] 98 %  BP: (154-191)/() 185/110     Weight: 75.3 kg (166 lb)  Body mass index is 31.37 kg/m².    Intake/Output Summary (Last 24 hours) at 4/21/2023 1814  Last data filed at 4/21/2023 0539  Gross per 24 hour   Intake 240 ml   Output --   Net 240 ml      Physical Exam  Vitals and nursing note reviewed. Exam conducted with a chaperone present.   Constitutional:       General: She is awake. She is not in acute distress.     Appearance: Normal appearance. She is normal weight. She is not ill-appearing, toxic-appearing or diaphoretic.   HENT:       Head: Normocephalic.      Right Ear: External ear normal.      Left Ear: External ear normal.      Nose: Nose normal.      Mouth/Throat:      Mouth: Mucous membranes are moist.      Pharynx: Oropharynx is clear.   Eyes:      Pupils: Pupils are equal, round, and reactive to light.   Cardiovascular:      Rate and Rhythm: Regular rhythm. Tachycardia present.      Pulses: Normal pulses.      Heart sounds: Normal heart sounds.   Pulmonary:      Effort: Pulmonary effort is normal.      Breath sounds: Normal breath sounds.   Abdominal:      General: Bowel sounds are normal. There is no distension.      Palpations: There is no mass.      Tenderness: There is no abdominal tenderness. There is no right CVA tenderness, left CVA tenderness, guarding or rebound.      Hernia: No hernia is present.   Musculoskeletal:         General: Normal range of motion.      Cervical back: Normal range of motion.   Skin:     General: Skin is warm and dry.      Capillary Refill: Capillary refill takes less than 2 seconds.   Neurological:      General: No focal deficit present.      Mental Status: She is alert and oriented to person, place, and time. Mental status is at baseline.      Motor: Motor function is intact. No weakness (Generalized).   Psychiatric:         Mood and Affect: Mood normal.         Speech: Speech normal.         Behavior: Behavior normal. Behavior is cooperative.         Thought Content: Thought content normal.         Judgment: Judgment normal.       Significant Labs: All pertinent labs within the past 24 hours have been reviewed.  CBC:   Recent Labs   Lab 04/20/23  1511 04/21/23  0450   WBC 23.25* 19.54*   HGB 13.9 12.2   HCT 41.5 37.0*   * 358     CMP:   Recent Labs   Lab 04/20/23  1511 04/21/23  0450   * 136   K 2.8* 3.0*   CL 96* 100   CO2 23 23   * 146*   BUN 27* 21*   CREATININE 1.27* 0.88   CALCIUM 9.1 8.4*   PROT 8.7*  --    ALBUMIN 4.8  --    BILITOT 0.8  --    ALKPHOS 81  --    AST 19  --     ALT 24  --    ANIONGAP 19* 16       Significant Imaging: I have reviewed all pertinent imaging results/findings within the past 24 hours.      Assessment/Plan:      * Urinary tract infection without hematuria  Urine culture pending  -Rocephin 1 gm IVPB Q 24 hours    04/21/2023: Urine culture pending and blood culture pending  -Rocephin 1 gm IV every 24 hours (day #2)  -CBC pending    Nausea  Treat with antiemetics   -Zofran 4 mg IV Q 6 hours PRN 1st choice  -Phenergan 25 mg IVPB Q 6 hours PRN 2nd choice.     04/21/2023: No nausea or vomiting today.   -Clear liquid diet, will advance or tolerated.       HTN (hypertension)  History of HTN with documented diastolic BP >105 as far back as 2021. Patient is known to be noncompliant with BP treatment. Will monitor BP Q 4 hours and treat accordingly.  -Hydralazine 10 mg IVP Q 4 hours PRN for DBP> 105    04/21/2023: /98 with HR 92. Continues to need Hydralazine prn for elevated blood pressure  -Start Metoprolol Succ 25 mg one tab by mouth daily  -Continue Hydralazine prn        Hypokalemia  Patient received 20 meq IV while in the ED  -0.45% NS with 20 meq KCl to infuse at 100 ml/hr  -Repeat BMP in AM    04/21/2023: Potassium up to 3.0. Tolerating po fluids. Continues to receive 0.45% NS with 20 KCL at 100 ml/hr.   -Potassium Chl 40 meq po x 1 dose  -BMP in am      VTE Risk Mitigation (From admission, onward)         Ordered     IP VTE HIGH RISK PATIENT  Once         04/20/23 1755     Place sequential compression device  Until discontinued         04/20/23 1755                Discharge Planning   MILKA:      Code Status: Full Code   Is the patient medically ready for discharge?:     Reason for patient still in hospital (select all that apply): Laboratory test and Treatment  Discharge Plan A: Home        Discussed patient's case, labs and medications with Dr. Santillan. He agreed with current POC.        Cammy Ruiz, P  Department of Hospital Medicine   [facility]

## 2023-04-21 NOTE — ASSESSMENT & PLAN NOTE
Urine culture pending  -Rocephin 1 gm IVPB Q 24 hours    04/21/2023: Urine culture pending and blood culture pending  -Rocephin 1 gm IV every 24 hours (day #2)  -CBC pending

## 2023-04-21 NOTE — SUBJECTIVE & OBJECTIVE
Interval History: WBC down to 19.54, K+ up to 3.0, BUN/CR improved to 21/0.88    Review of Systems   Constitutional:  Positive for fatigue. Negative for activity change (decreased), appetite change (decreased), chills and fever.   HENT: Negative.     Eyes: Negative.    Respiratory: Negative.     Cardiovascular: Negative.    Gastrointestinal:  Positive for nausea. Negative for abdominal distention, abdominal pain (Suprapubic), anal bleeding, blood in stool, constipation, diarrhea, rectal pain and vomiting.   Endocrine: Negative.    Genitourinary:  Negative for decreased urine volume, difficulty urinating, dysuria, enuresis, flank pain, frequency, genital sores, hematuria, menstrual problem, pelvic pain, urgency and vaginal bleeding (States that she started her cycle today 04/20/2023).   Musculoskeletal:  Negative for myalgias.   Skin: Negative.  Negative for rash.   Allergic/Immunologic: Negative.    Neurological:  Positive for weakness (generalized). Negative for dizziness, light-headedness and headaches.   Hematological: Negative.    Psychiatric/Behavioral: Negative.     Objective:     Vital Signs (Most Recent):  Temp: 98.2 °F (36.8 °C) (04/21/23 1500)  Pulse: 106 (04/21/23 1749)  Resp: 18 (04/21/23 1500)  BP: (!) 185/110 (04/21/23 1749)  SpO2: 98 % (04/21/23 1526)   Vital Signs (24h Range):  Temp:  [98.2 °F (36.8 °C)-98.4 °F (36.9 °C)] 98.2 °F (36.8 °C)  Pulse:  [] 106  Resp:  [18-20] 18  SpO2:  [96 %-100 %] 98 %  BP: (154-191)/() 185/110     Weight: 75.3 kg (166 lb)  Body mass index is 31.37 kg/m².    Intake/Output Summary (Last 24 hours) at 4/21/2023 1814  Last data filed at 4/21/2023 0539  Gross per 24 hour   Intake 240 ml   Output --   Net 240 ml      Physical Exam  Vitals and nursing note reviewed. Exam conducted with a chaperone present.   Constitutional:       General: She is awake. She is not in acute distress.     Appearance: Normal appearance. She is normal weight. She is not ill-appearing,  toxic-appearing or diaphoretic.   HENT:      Head: Normocephalic.      Right Ear: External ear normal.      Left Ear: External ear normal.      Nose: Nose normal.      Mouth/Throat:      Mouth: Mucous membranes are moist.      Pharynx: Oropharynx is clear.   Eyes:      Pupils: Pupils are equal, round, and reactive to light.   Cardiovascular:      Rate and Rhythm: Regular rhythm. Tachycardia present.      Pulses: Normal pulses.      Heart sounds: Normal heart sounds.   Pulmonary:      Effort: Pulmonary effort is normal.      Breath sounds: Normal breath sounds.   Abdominal:      General: Bowel sounds are normal. There is no distension.      Palpations: There is no mass.      Tenderness: There is no abdominal tenderness. There is no right CVA tenderness, left CVA tenderness, guarding or rebound.      Hernia: No hernia is present.   Musculoskeletal:         General: Normal range of motion.      Cervical back: Normal range of motion.   Skin:     General: Skin is warm and dry.      Capillary Refill: Capillary refill takes less than 2 seconds.   Neurological:      General: No focal deficit present.      Mental Status: She is alert and oriented to person, place, and time. Mental status is at baseline.      Motor: Motor function is intact. No weakness (Generalized).   Psychiatric:         Mood and Affect: Mood normal.         Speech: Speech normal.         Behavior: Behavior normal. Behavior is cooperative.         Thought Content: Thought content normal.         Judgment: Judgment normal.       Significant Labs: All pertinent labs within the past 24 hours have been reviewed.  CBC:   Recent Labs   Lab 04/20/23  1511 04/21/23  0450   WBC 23.25* 19.54*   HGB 13.9 12.2   HCT 41.5 37.0*   * 358     CMP:   Recent Labs   Lab 04/20/23  1511 04/21/23  0450   * 136   K 2.8* 3.0*   CL 96* 100   CO2 23 23   * 146*   BUN 27* 21*   CREATININE 1.27* 0.88   CALCIUM 9.1 8.4*   PROT 8.7*  --    ALBUMIN 4.8  --    BILITOT  0.8  --    ALKPHOS 81  --    AST 19  --    ALT 24  --    ANIONGAP 19* 16       Significant Imaging: I have reviewed all pertinent imaging results/findings within the past 24 hours.

## 2023-04-21 NOTE — ASSESSMENT & PLAN NOTE
History of HTN with documented diastolic BP >105 as far back as 2021. Patient is known to be noncompliant with BP treatment. Will monitor BP Q 4 hours and treat accordingly.  -Hydralazine 10 mg IVP Q 4 hours PRN for DBP> 105    04/21/2023: /98 with HR 92. Continues to need Hydralazine prn for elevated blood pressure  -Start Metoprolol Succ 25 mg one tab by mouth daily  -Continue Hydralazine prn

## 2023-04-22 LAB
ANION GAP SERPL CALCULATED.3IONS-SCNC: 17 MMOL/L (ref 7–16)
BASOPHILS # BLD AUTO: 0.07 K/UL (ref 0–0.2)
BASOPHILS NFR BLD AUTO: 0.4 % (ref 0–1)
BUN SERPL-MCNC: 23 MG/DL (ref 7–18)
BUN/CREAT SERPL: 19 (ref 6–20)
CALCIUM SERPL-MCNC: 8.3 MG/DL (ref 8.5–10.1)
CHLORIDE SERPL-SCNC: 101 MMOL/L (ref 98–107)
CO2 SERPL-SCNC: 24 MMOL/L (ref 21–32)
CREAT SERPL-MCNC: 1.21 MG/DL (ref 0.55–1.02)
DIFFERENTIAL METHOD BLD: ABNORMAL
EGFR (NO RACE VARIABLE) (RUSH/TITUS): 58 ML/MIN/1.73M²
EOSINOPHIL # BLD AUTO: 0.07 K/UL (ref 0–0.5)
EOSINOPHIL NFR BLD AUTO: 0.4 % (ref 1–4)
EOSINOPHIL NFR BLD MANUAL: 1 % (ref 1–4)
ERYTHROCYTE [DISTWIDTH] IN BLOOD BY AUTOMATED COUNT: 16.4 % (ref 11.5–14.5)
GLUCOSE SERPL-MCNC: 120 MG/DL (ref 74–106)
HCT VFR BLD AUTO: 37.2 % (ref 38–47)
HGB BLD-MCNC: 12.2 G/DL (ref 12–16)
LYMPHOCYTES # BLD AUTO: 4.59 K/UL (ref 1–4.8)
LYMPHOCYTES NFR BLD AUTO: 23.9 % (ref 27–41)
LYMPHOCYTES NFR BLD MANUAL: 23 % (ref 27–41)
MCH RBC QN AUTO: 25.3 PG (ref 27–31)
MCHC RBC AUTO-ENTMCNC: 32.8 G/DL (ref 32–36)
MCV RBC AUTO: 77 FL (ref 80–96)
MONOCYTES # BLD AUTO: 1.36 K/UL (ref 0–0.8)
MONOCYTES NFR BLD AUTO: 7.1 % (ref 2–6)
MONOCYTES NFR BLD MANUAL: 6 % (ref 2–6)
MPC BLD CALC-MCNC: 9.8 FL (ref 9.4–12.4)
NEUTROPHILS # BLD AUTO: 13.09 K/UL (ref 1.8–7.7)
NEUTROPHILS NFR BLD AUTO: 68.2 % (ref 53–65)
NEUTS SEG NFR BLD MANUAL: 70 % (ref 50–62)
NRBC BLD MANUAL-RTO: ABNORMAL %
PLATELET # BLD AUTO: 353 K/UL (ref 150–400)
POTASSIUM SERPL-SCNC: 3.5 MMOL/L (ref 3.5–5.1)
RBC # BLD AUTO: 4.83 M/UL (ref 4.2–5.4)
SODIUM SERPL-SCNC: 138 MMOL/L (ref 136–145)
WBC # BLD AUTO: 19.18 K/UL (ref 4.5–11)

## 2023-04-22 PROCEDURE — 94761 N-INVAS EAR/PLS OXIMETRY MLT: CPT

## 2023-04-22 PROCEDURE — 99232 PR SUBSEQUENT HOSPITAL CARE,LEVL II: ICD-10-PCS | Mod: ,,, | Performed by: NURSE PRACTITIONER

## 2023-04-22 PROCEDURE — 85025 COMPLETE CBC W/AUTO DIFF WBC: CPT | Performed by: REGISTERED NURSE

## 2023-04-22 PROCEDURE — 99232 SBSQ HOSP IP/OBS MODERATE 35: CPT | Mod: ,,, | Performed by: NURSE PRACTITIONER

## 2023-04-22 PROCEDURE — 25000003 PHARM REV CODE 250: Performed by: NURSE PRACTITIONER

## 2023-04-22 PROCEDURE — 63600175 PHARM REV CODE 636 W HCPCS: Performed by: REGISTERED NURSE

## 2023-04-22 PROCEDURE — 25000003 PHARM REV CODE 250: Performed by: REGISTERED NURSE

## 2023-04-22 PROCEDURE — 11000001 HC ACUTE MED/SURG PRIVATE ROOM

## 2023-04-22 PROCEDURE — 80048 BASIC METABOLIC PNL TOTAL CA: CPT | Performed by: REGISTERED NURSE

## 2023-04-22 RX ADMIN — POTASSIUM CHLORIDE AND SODIUM CHLORIDE: 450; 150 INJECTION, SOLUTION INTRAVENOUS at 12:04

## 2023-04-22 RX ADMIN — CEFTRIAXONE 1 G: 1 INJECTION, POWDER, FOR SOLUTION INTRAMUSCULAR; INTRAVENOUS at 05:04

## 2023-04-22 RX ADMIN — POTASSIUM CHLORIDE AND SODIUM CHLORIDE: 450; 150 INJECTION, SOLUTION INTRAVENOUS at 11:04

## 2023-04-22 RX ADMIN — POTASSIUM CHLORIDE AND SODIUM CHLORIDE: 450; 150 INJECTION, SOLUTION INTRAVENOUS at 03:04

## 2023-04-22 RX ADMIN — METOPROLOL SUCCINATE 25 MG: 25 TABLET, EXTENDED RELEASE ORAL at 09:04

## 2023-04-22 NOTE — PROGRESS NOTES
[facility] - Medical Surgical Unit  Hospital Medicine  Progress Note    Patient Name: Blanca Campoverde  MRN: [id number]  Patient Class: IP- Inpatient   Admission Date: 4/20/2023  Length of Stay: 2 days  Attending Physician: Hipolito Vázquez DO  Primary Care Provider: Khanh Trinh MD        Subjective:     Principal Problem:Urinary tract infection without hematuria        HPI:  42 y-old  female with PMH of HTN and medical noncompliance who presents to the ED with c/o nausea that has been unresponsive to Zofran/Phenergan. Patient was seen at [facility] X2 on 04/18/2023 with c/o N/V/D and was diagnosed with gastroenteritis, HTN and hypokalemia. She was treated with IVF, multiple K+ infusions, Zofran, Phenergan, Hydralazine and labetalol. Patient reached maximum benefit at that time and was discharged with prescriptions for Phenergan/Zofran/Potassium and  instructions to f/u with her PCP on the following day 04/19/2023. Patient states that she could not afford her Potassium RX so she only filled her Phenergan and did not f/u with her PCP as discussed. Presents to the ED today 04/20/2023 with complaint of intractable nausea and decreased urine output. Denies any diarrhea. Work up reveals a WBC of 23.23, Lactic acid of 2.2, K+ of 2.8, UA that is Nitrite, leukocyte and blood positive. Patient was treated in the ED with IVF, K+ 20 meq, Rocephin 1 gram, Toradol 30 mg and hydralazine. Patient is to be admitted to [facility] for IVF, antiemetics, supplemental K+ and Rocephin to treat her UTI/possible Pyelo.       Overview/Hospital Course:  04/21/2023: Patient is hospital day #2 receiving 0.45% NS with 20 KCL at 100 ml/hr. Has not had any nausea or vomiting since admission. Denies any abdominal pain at present. No new complaints. Labs today: WBC down to 19.54, K+ up to 3.0, BUN/CR improved to 21/0.88. Urine culture and blood cultures are pending.     04/22/2023: Hospital day #3. Continues to receive  0.45% NS with 20 KCL at 100 ml/hr and Rocephin 1 gm IV daily. Had episodes of nausea yesterday, but no vomiting. Denies any pain at present. Continues to have bouts with elevated blood pressure.  Labs today: WBC still elevated at 19.18, Na 138, K+ improved to 3.5, BUN/CR up to 23/1.21. Blood culture no growth at 48 hours and urine culture pending. No new complaints today.      Interval History: Today WBC is 19.18, BUN/CR up to 23/1.21. No nausea, vomiting or abdominal pain today.    Review of Systems   Constitutional:  Positive for fatigue. Negative for activity change (decreased), appetite change (decreased), chills and fever.   HENT: Negative.     Eyes: Negative.    Respiratory: Negative.     Cardiovascular: Negative.    Gastrointestinal:  Positive for nausea. Negative for abdominal distention, abdominal pain (Suprapubic), anal bleeding, blood in stool, constipation, diarrhea, rectal pain and vomiting.   Endocrine: Negative.    Genitourinary:  Negative for decreased urine volume, difficulty urinating, dysuria, enuresis, flank pain, frequency, genital sores, hematuria, menstrual problem, pelvic pain, urgency and vaginal bleeding (States that she started her cycle today 04/20/2023).   Musculoskeletal:  Negative for myalgias.   Skin: Negative.  Negative for rash.   Allergic/Immunologic: Negative.    Neurological:  Positive for weakness (generalized). Negative for dizziness, light-headedness and headaches.   Hematological: Negative.    Psychiatric/Behavioral: Negative.     Objective:     Vital Signs (Most Recent):  Temp: 98.3 °F (36.8 °C) (04/22/23 0720)  Pulse: 74 (04/22/23 0753)  Resp: 18 (04/22/23 0753)  BP: 139/87 (04/22/23 0720)  SpO2: 98 % (04/22/23 1103) Vital Signs (24h Range):  Temp:  [98 °F (36.7 °C)-98.3 °F (36.8 °C)] 98.3 °F (36.8 °C)  Pulse:  [] 74  Resp:  [15-20] 18  SpO2:  [96 %-99 %] 98 %  BP: (132-190)/() 139/87     Weight: 75.3 kg (166 lb)  Body mass index is 31.37 kg/m².    Intake/Output  Summary (Last 24 hours) at 4/22/2023 1151  Last data filed at 4/22/2023 0600  Gross per 24 hour   Intake 3335 ml   Output --   Net 3335 ml      Physical Exam  Vitals and nursing note reviewed. Exam conducted with a chaperone present.   Constitutional:       General: She is awake. She is not in acute distress.     Appearance: Normal appearance. She is normal weight. She is not ill-appearing, toxic-appearing or diaphoretic.   HENT:      Head: Normocephalic.      Right Ear: External ear normal.      Left Ear: External ear normal.      Nose: Nose normal.      Mouth/Throat:      Mouth: Mucous membranes are moist.      Pharynx: Oropharynx is clear.   Eyes:      Pupils: Pupils are equal, round, and reactive to light.   Cardiovascular:      Rate and Rhythm: Normal rate and regular rhythm.      Pulses: Normal pulses.      Heart sounds: Normal heart sounds.   Pulmonary:      Effort: Pulmonary effort is normal.      Breath sounds: Normal breath sounds.   Abdominal:      General: Bowel sounds are normal. There is no distension.      Palpations: There is no mass.      Tenderness: There is no abdominal tenderness. There is no right CVA tenderness, left CVA tenderness, guarding or rebound.      Hernia: No hernia is present.   Musculoskeletal:         General: Normal range of motion.      Cervical back: Normal range of motion.      Right lower leg: No edema.      Left lower leg: No edema.   Skin:     General: Skin is warm and dry.      Capillary Refill: Capillary refill takes less than 2 seconds.   Neurological:      General: No focal deficit present.      Mental Status: She is alert and oriented to person, place, and time. Mental status is at baseline.      Motor: Motor function is intact. No weakness (Generalized).   Psychiatric:         Mood and Affect: Mood normal.         Speech: Speech normal.         Behavior: Behavior normal. Behavior is cooperative.         Thought Content: Thought content normal.         Judgment:  Judgment normal.       Significant Labs: All pertinent labs within the past 24 hours have been reviewed.  BMP:   Recent Labs   Lab 04/20/23  1511 04/21/23  0450 04/22/23  0510   *   < > 120*   *   < > 138   K 2.8*   < > 3.5   CL 96*   < > 101   CO2 23   < > 24   BUN 27*   < > 23*   CREATININE 1.27*   < > 1.21*   CALCIUM 9.1   < > 8.3*   MG 2.0  --   --     < > = values in this interval not displayed.     CBC:   Recent Labs   Lab 04/20/23  1511 04/21/23  0450 04/22/23  0510   WBC 23.25* 19.54* 19.18*   HGB 13.9 12.2 12.2   HCT 41.5 37.0* 37.2*   * 358 353       Significant Imaging: I have reviewed all pertinent imaging results/findings within the past 24 hours.      Assessment/Plan:      * Urinary tract infection without hematuria  Urine culture pending  -Rocephin 1 gm IVPB Q 24 hours    04/21/2023: Urine culture pending and blood culture pending  -Rocephin 1 gm IV every 24 hours (day #2)  -CBC pending    04/22/2023: WBC still elevated at 19.18. Blood culture no growth at 48 hours. Urine culture pending.  -Continue Rocephin 1 gm IV every 24 hours. (day #3)  -CBC in am    Nausea  Treat with antiemetics   -Zofran 4 mg IV Q 6 hours PRN 1st choice  -Phenergan 25 mg IVPB Q 6 hours PRN 2nd choice.     04/21/2023: No nausea or vomiting today.   -Clear liquid diet, will advance or tolerated.     04/22/2023: Had episodes of nausea, but no vomiting. Will continue with clear liquid diet. If nausea does not return will advance to full liquid diet.      HTN (hypertension)  History of HTN with documented diastolic BP >105 as far back as 2021. Patient is known to be noncompliant with BP treatment. Will monitor BP Q 4 hours and treat accordingly.  -Hydralazine 10 mg IVP Q 4 hours PRN for DBP> 105    04/21/2023: /98 with HR 92. Continues to need Hydralazine prn for elevated blood pressure  -Start Metoprolol Succ 25 mg one tab by mouth daily  -Continue Hydralazine prn      04/22/2023: BP today is 134/87 WITH  hr 76. Will continue to monitor BP/HR.      Hypokalemia  Patient received 20 meq IV while in the ED  -0.45% NS with 20 meq KCl to infuse at 100 ml/hr  -Repeat BMP in AM    04/21/2023: Potassium up to 3.0. Tolerating po fluids. Continues to receive 0.45% NS with 20 KCL at 100 ml/hr.   -Potassium Chl 40 meq po x 1 dose  -BMP in am    04/22/2023: Potassium up to 3.5. BUN/CR up to 23/1.21.   -Continue 0.45% NS with 20 meq KCL at 100 ml/hr  -Encouraged po fluids    VTE Risk Mitigation (From admission, onward)         Ordered     IP VTE HIGH RISK PATIENT  Once         04/20/23 1755     Place sequential compression device  Until discontinued         04/20/23 1755                Discharge Planning   MILKA:      Code Status: Full Code   Is the patient medically ready for discharge?:     Reason for patient still in hospital (select all that apply): Laboratory test and Treatment  Discharge Plan A: Home        Discussed patient's case, labs and medications with Dr. Santillan. Will continue with current POC.          IRAIDA Topete  Department of Hospital Medicine   [facility] - [facility]

## 2023-04-22 NOTE — ASSESSMENT & PLAN NOTE
Urine culture pending  -Rocephin 1 gm IVPB Q 24 hours    04/21/2023: Urine culture pending and blood culture pending  -Rocephin 1 gm IV every 24 hours (day #2)  -CBC pending    04/22/2023: WBC still elevated at 19.18. Blood culture no growth at 48 hours. Urine culture pending.  -Continue Rocephin 1 gm IV every 24 hours. (day #3)  -CBC in am

## 2023-04-22 NOTE — PLAN OF CARE
Patient is sleeping but wakes to voice.  HR 74, Rr 18, o2 sat 97% on room air with clear bilateral breath sounds.

## 2023-04-22 NOTE — PLAN OF CARE
Problem: Infection  Goal: Absence of Infection Signs and Symptoms  Outcome: Ongoing, Progressing     Problem: Adult Inpatient Plan of Care  Goal: Plan of Care Review  Outcome: Ongoing, Progressing  Goal: Patient-Specific Goal (Individualized)  Outcome: Ongoing, Progressing  Goal: Absence of Hospital-Acquired Illness or Injury  Outcome: Ongoing, Progressing  Goal: Optimal Comfort and Wellbeing  Outcome: Ongoing, Progressing  Goal: Readiness for Transition of Care  Outcome: Ongoing, Progressing     Problem: Pain Acute  Goal: Acceptable Pain Control and Functional Ability  Outcome: Ongoing, Progressing

## 2023-04-22 NOTE — PLAN OF CARE
Problem: Infection  Goal: Absence of Infection Signs and Symptoms  Outcome: Ongoing, Progressing     Problem: Adult Inpatient Plan of Care  Goal: Plan of Care Review  Outcome: Ongoing, Progressing  Goal: Patient-Specific Goal (Individualized)  Outcome: Ongoing, Progressing     Problem: Pain Acute  Goal: Acceptable Pain Control and Functional Ability  Outcome: Ongoing, Progressing     Problem: Hypertension Acute  Goal: Blood Pressure Within Desired Range  Outcome: Ongoing, Progressing

## 2023-04-22 NOTE — ASSESSMENT & PLAN NOTE
Patient received 20 meq IV while in the ED  -0.45% NS with 20 meq KCl to infuse at 100 ml/hr  -Repeat BMP in AM    04/21/2023: Potassium up to 3.0. Tolerating po fluids. Continues to receive 0.45% NS with 20 KCL at 100 ml/hr.   -Potassium Chl 40 meq po x 1 dose  -BMP in am    04/22/2023: Potassium up to 3.5. BUN/CR up to 23/1.21.   -Continue 0.45% NS with 20 meq KCL at 100 ml/hr  -Encouraged po fluids

## 2023-04-22 NOTE — SUBJECTIVE & OBJECTIVE
Interval History: Today WBC is 19.18, BUN/CR up to 23/1.21. No nausea, vomiting or abdominal pain today.    Review of Systems   Constitutional:  Positive for fatigue. Negative for activity change (decreased), appetite change (decreased), chills and fever.   HENT: Negative.     Eyes: Negative.    Respiratory: Negative.     Cardiovascular: Negative.    Gastrointestinal:  Positive for nausea. Negative for abdominal distention, abdominal pain (Suprapubic), anal bleeding, blood in stool, constipation, diarrhea, rectal pain and vomiting.   Endocrine: Negative.    Genitourinary:  Negative for decreased urine volume, difficulty urinating, dysuria, enuresis, flank pain, frequency, genital sores, hematuria, menstrual problem, pelvic pain, urgency and vaginal bleeding (States that she started her cycle today 04/20/2023).   Musculoskeletal:  Negative for myalgias.   Skin: Negative.  Negative for rash.   Allergic/Immunologic: Negative.    Neurological:  Positive for weakness (generalized). Negative for dizziness, light-headedness and headaches.   Hematological: Negative.    Psychiatric/Behavioral: Negative.     Objective:     Vital Signs (Most Recent):  Temp: 98.3 °F (36.8 °C) (04/22/23 0720)  Pulse: 74 (04/22/23 0753)  Resp: 18 (04/22/23 0753)  BP: 139/87 (04/22/23 0720)  SpO2: 98 % (04/22/23 1103) Vital Signs (24h Range):  Temp:  [98 °F (36.7 °C)-98.3 °F (36.8 °C)] 98.3 °F (36.8 °C)  Pulse:  [] 74  Resp:  [15-20] 18  SpO2:  [96 %-99 %] 98 %  BP: (132-190)/() 139/87     Weight: 75.3 kg (166 lb)  Body mass index is 31.37 kg/m².    Intake/Output Summary (Last 24 hours) at 4/22/2023 1151  Last data filed at 4/22/2023 0600  Gross per 24 hour   Intake 3335 ml   Output --   Net 3335 ml      Physical Exam  Vitals and nursing note reviewed. Exam conducted with a chaperone present.   Constitutional:       General: She is awake. She is not in acute distress.     Appearance: Normal appearance. She is normal weight. She is not  ill-appearing, toxic-appearing or diaphoretic.   HENT:      Head: Normocephalic.      Right Ear: External ear normal.      Left Ear: External ear normal.      Nose: Nose normal.      Mouth/Throat:      Mouth: Mucous membranes are moist.      Pharynx: Oropharynx is clear.   Eyes:      Pupils: Pupils are equal, round, and reactive to light.   Cardiovascular:      Rate and Rhythm: Normal rate and regular rhythm.      Pulses: Normal pulses.      Heart sounds: Normal heart sounds.   Pulmonary:      Effort: Pulmonary effort is normal.      Breath sounds: Normal breath sounds.   Abdominal:      General: Bowel sounds are normal. There is no distension.      Palpations: There is no mass.      Tenderness: There is no abdominal tenderness. There is no right CVA tenderness, left CVA tenderness, guarding or rebound.      Hernia: No hernia is present.   Musculoskeletal:         General: Normal range of motion.      Cervical back: Normal range of motion.      Right lower leg: No edema.      Left lower leg: No edema.   Skin:     General: Skin is warm and dry.      Capillary Refill: Capillary refill takes less than 2 seconds.   Neurological:      General: No focal deficit present.      Mental Status: She is alert and oriented to person, place, and time. Mental status is at baseline.      Motor: Motor function is intact. No weakness (Generalized).   Psychiatric:         Mood and Affect: Mood normal.         Speech: Speech normal.         Behavior: Behavior normal. Behavior is cooperative.         Thought Content: Thought content normal.         Judgment: Judgment normal.       Significant Labs: All pertinent labs within the past 24 hours have been reviewed.  BMP:   Recent Labs   Lab 04/20/23  1511 04/21/23  0450 04/22/23  0510   *   < > 120*   *   < > 138   K 2.8*   < > 3.5   CL 96*   < > 101   CO2 23   < > 24   BUN 27*   < > 23*   CREATININE 1.27*   < > 1.21*   CALCIUM 9.1   < > 8.3*   MG 2.0  --   --     < > = values  in this interval not displayed.     CBC:   Recent Labs   Lab 04/20/23  1511 04/21/23  0450 04/22/23  0510   WBC 23.25* 19.54* 19.18*   HGB 13.9 12.2 12.2   HCT 41.5 37.0* 37.2*   * 358 353       Significant Imaging: I have reviewed all pertinent imaging results/findings within the past 24 hours.

## 2023-04-22 NOTE — ASSESSMENT & PLAN NOTE
History of HTN with documented diastolic BP >105 as far back as 2021. Patient is known to be noncompliant with BP treatment. Will monitor BP Q 4 hours and treat accordingly.  -Hydralazine 10 mg IVP Q 4 hours PRN for DBP> 105    04/21/2023: /98 with HR 92. Continues to need Hydralazine prn for elevated blood pressure  -Start Metoprolol Succ 25 mg one tab by mouth daily  -Continue Hydralazine prn      04/22/2023: BP today is 134/87 WITH hr 76. Will continue to monitor BP/HR.

## 2023-04-22 NOTE — ASSESSMENT & PLAN NOTE
Treat with antiemetics   -Zofran 4 mg IV Q 6 hours PRN 1st choice  -Phenergan 25 mg IVPB Q 6 hours PRN 2nd choice.     04/21/2023: No nausea or vomiting today.   -Clear liquid diet, will advance or tolerated.     04/22/2023: Had episodes of nausea, but no vomiting. Will continue with clear liquid diet. If nausea does not return will advance to full liquid diet.

## 2023-04-23 VITALS
SYSTOLIC BLOOD PRESSURE: 163 MMHG | BODY MASS INDEX: 31.34 KG/M2 | RESPIRATION RATE: 17 BRPM | HEIGHT: 61 IN | TEMPERATURE: 99 F | WEIGHT: 166 LBS | DIASTOLIC BLOOD PRESSURE: 113 MMHG | OXYGEN SATURATION: 100 % | HEART RATE: 74 BPM

## 2023-04-23 LAB
ANION GAP SERPL CALCULATED.3IONS-SCNC: 15 MMOL/L (ref 7–16)
BASOPHILS # BLD AUTO: 0.05 K/UL (ref 0–0.2)
BASOPHILS NFR BLD AUTO: 0.4 % (ref 0–1)
BUN SERPL-MCNC: 15 MG/DL (ref 7–18)
BUN/CREAT SERPL: 16 (ref 6–20)
CALCIUM SERPL-MCNC: 8.1 MG/DL (ref 8.5–10.1)
CHLORIDE SERPL-SCNC: 105 MMOL/L (ref 98–107)
CO2 SERPL-SCNC: 24 MMOL/L (ref 21–32)
CREAT SERPL-MCNC: 0.91 MG/DL (ref 0.55–1.02)
DIFFERENTIAL METHOD BLD: ABNORMAL
EGFR (NO RACE VARIABLE) (RUSH/TITUS): 81 ML/MIN/1.73M²
EOSINOPHIL # BLD AUTO: 0.18 K/UL (ref 0–0.5)
EOSINOPHIL NFR BLD AUTO: 1.3 % (ref 1–4)
ERYTHROCYTE [DISTWIDTH] IN BLOOD BY AUTOMATED COUNT: 15.8 % (ref 11.5–14.5)
GLUCOSE SERPL-MCNC: 96 MG/DL (ref 74–106)
HCT VFR BLD AUTO: 34.9 % (ref 38–47)
HGB BLD-MCNC: 11.3 G/DL (ref 12–16)
LYMPHOCYTES # BLD AUTO: 4.02 K/UL (ref 1–4.8)
LYMPHOCYTES NFR BLD AUTO: 29.9 % (ref 27–41)
MCH RBC QN AUTO: 25.1 PG (ref 27–31)
MCHC RBC AUTO-ENTMCNC: 32.4 G/DL (ref 32–36)
MCV RBC AUTO: 77.4 FL (ref 80–96)
MONOCYTES # BLD AUTO: 0.91 K/UL (ref 0–0.8)
MONOCYTES NFR BLD AUTO: 6.8 % (ref 2–6)
MPC BLD CALC-MCNC: 9.8 FL (ref 9.4–12.4)
NEUTROPHILS # BLD AUTO: 8.27 K/UL (ref 1.8–7.7)
NEUTROPHILS NFR BLD AUTO: 61.6 % (ref 53–65)
PLATELET # BLD AUTO: 335 K/UL (ref 150–400)
POTASSIUM SERPL-SCNC: 3.3 MMOL/L (ref 3.5–5.1)
RBC # BLD AUTO: 4.51 M/UL (ref 4.2–5.4)
SODIUM SERPL-SCNC: 141 MMOL/L (ref 136–145)
UA COMPLETE W REFLEX CULTURE PNL UR: NORMAL
WBC # BLD AUTO: 13.43 K/UL (ref 4.5–11)

## 2023-04-23 PROCEDURE — 80048 BASIC METABOLIC PNL TOTAL CA: CPT | Performed by: REGISTERED NURSE

## 2023-04-23 PROCEDURE — 99238 HOSP IP/OBS DSCHRG MGMT 30/<: CPT | Mod: ,,, | Performed by: NURSE PRACTITIONER

## 2023-04-23 PROCEDURE — 85025 COMPLETE CBC W/AUTO DIFF WBC: CPT | Performed by: REGISTERED NURSE

## 2023-04-23 PROCEDURE — 25000003 PHARM REV CODE 250: Performed by: REGISTERED NURSE

## 2023-04-23 PROCEDURE — 25000003 PHARM REV CODE 250: Performed by: NURSE PRACTITIONER

## 2023-04-23 PROCEDURE — 99238 PR HOSPITAL DISCHARGE DAY,<30 MIN: ICD-10-PCS | Mod: ,,, | Performed by: NURSE PRACTITIONER

## 2023-04-23 RX ORDER — METOPROLOL SUCCINATE 25 MG/1
25 TABLET, EXTENDED RELEASE ORAL DAILY
Qty: 30 TABLET | Refills: 0 | Status: SHIPPED | OUTPATIENT
Start: 2023-04-23 | End: 2023-06-13

## 2023-04-23 RX ORDER — CEPHALEXIN 500 MG/1
500 CAPSULE ORAL EVERY 12 HOURS
Qty: 20 CAPSULE | Refills: 0 | Status: SHIPPED | OUTPATIENT
Start: 2023-04-23 | End: 2023-05-13

## 2023-04-23 RX ADMIN — HYDROCODONE BITARTRATE AND ACETAMINOPHEN 1 TABLET: 7.5; 325 TABLET ORAL at 07:04

## 2023-04-23 RX ADMIN — METOPROLOL SUCCINATE 25 MG: 25 TABLET, EXTENDED RELEASE ORAL at 07:04

## 2023-04-23 NOTE — ASSESSMENT & PLAN NOTE
Patient received 20 meq IV while in the ED  -0.45% NS with 20 meq KCl to infuse at 100 ml/hr  -Repeat BMP in AM    04/21/2023: Potassium up to 3.0. Tolerating po fluids. Continues to receive 0.45% NS with 20 KCL at 100 ml/hr.   -Potassium Chl 40 meq po x 1 dose  -BMP in am    04/22/2023: Potassium up to 3.5. BUN/CR up to 23/1.21.   -Continue 0.45% NS with 20 meq KCL at 100 ml/hr  -Encouraged po fluids    04/23/2023: Potassium 3.3, BUN/CR down to 15/0.91  -Potassium Chl 20 meq take one tab by mouth twice a day   -Repeat BMP at hospital follow with PCP

## 2023-04-23 NOTE — PLAN OF CARE
Problem: Adult Inpatient Plan of Care  Goal: Plan of Care Review  Outcome: Ongoing, Progressing  Goal: Patient-Specific Goal (Individualized)  Outcome: Ongoing, Progressing     Problem: Pain Acute  Goal: Acceptable Pain Control and Functional Ability  Outcome: Ongoing, Progressing     Problem: Hypertension Acute  Goal: Blood Pressure Within Desired Range  Outcome: Ongoing, Progressing

## 2023-04-23 NOTE — DISCHARGE SUMMARY
[facility] - Medical Surgical Unit  Hospital Medicine  Discharge Summary      Patient Name: Blanca Campoverde  MRN: [id number]  [facility]: [id number]  Patient Class: IP- Inpatient  Admission Date: 4/20/2023  Hospital Length of Stay: 3 days  Discharge Date and Time:  04/23/2023 8:06 AM  Attending Physician: Hipolito Vázquez DO   Discharging Provider: IRAIDA Topete  Primary Care Provider: Khanh Trinh MD    Primary Care Team: Networked reference to record PCT     HPI:   42 y-old  female with PMH of HTN and medical noncompliance who presents to the ED with c/o nausea that has been unresponsive to Zofran/Phenergan. Patient was seen at [facility] X2 on 04/18/2023 with c/o N/V/D and was diagnosed with gastroenteritis, HTN and hypokalemia. She was treated with IVF, multiple K+ infusions, Zofran, Phenergan, Hydralazine and labetalol. Patient reached maximum benefit at that time and was discharged with prescriptions for Phenergan/Zofran/Potassium and  instructions to f/u with her PCP on the following day 04/19/2023. Patient states that she could not afford her Potassium RX so she only filled her Phenergan and did not f/u with her PCP as discussed. Presents to the ED today 04/20/2023 with complaint of intractable nausea and decreased urine output. Denies any diarrhea. Work up reveals a WBC of 23.23, Lactic acid of 2.2, K+ of 2.8, UA that is Nitrite, leukocyte and blood positive. Patient was treated in the ED with IVF, K+ 20 meq, Rocephin 1 gram, Toradol 30 mg and hydralazine. Patient is to be admitted to [facility] for IVF, antiemetics, supplemental K+ and Rocephin to treat her UTI/possible Pyelo.       * No surgery found *      Hospital Course:   04/21/2023: Patient is hospital day #2 receiving 0.45% NS with 20 KCL at 100 ml/hr. Has not had any nausea or vomiting since admission. Denies any abdominal pain at present. No new complaints. Labs today: WBC down to 19.54, K+ up to 3.0, BUN/CR  improved to 21/0.88. Urine culture and blood cultures are pending.     04/22/2023: Hospital day #3. Continues to receive 0.45% NS with 20 KCL at 100 ml/hr and Rocephin 1 gm IV daily. Had episodes of nausea yesterday, but no vomiting. Denies any pain at present. Continues to have bouts with elevated blood pressure.  Labs today: WBC still elevated at 19.18, Na 138, K+ improved to 3.5, BUN/CR up to 23/1.21. Blood culture no growth at 48 hours and urine culture pending. No new complaints today.    04/23/2023: Hospital day #4. Patient has not had any further nausea or vomiting. Reports feeling much better today. BP has VS stable. BP has improved. Labs today: WBC down to 13.43, H&H stable at 11.3/34.9, Na 141, K+ 3.3, BUN/CR 15/0.91.        Goals of Care Treatment Preferences:  Code Status: Full Code      Consults:     Cardiac/Vascular  HTN (hypertension)  History of HTN with documented diastolic BP >105 as far back as 2021. Patient is known to be noncompliant with BP treatment. Will monitor BP Q 4 hours and treat accordingly.  -Hydralazine 10 mg IVP Q 4 hours PRN for DBP> 105    04/21/2023: /98 with HR 92. Continues to need Hydralazine prn for elevated blood pressure  -Start Metoprolol Succ 25 mg one tab by mouth daily  -Continue Hydralazine prn      04/22/2023: BP today is 134/87 WITH hr 76. Will continue to monitor BP/HR.    04/23/2023:BP and HR has improved with being on Metoprolol.   -Continue Metoprolol Succ 25 mg take one tab by mouth daily  -Continue to monitor BP and HR      Renal/  * Urinary tract infection without hematuria  Urine culture pending  -Rocephin 1 gm IVPB Q 24 hours    04/21/2023: Urine culture pending and blood culture pending  -Rocephin 1 gm IV every 24 hours (day #2)  -CBC pending    04/22/2023: WBC still elevated at 19.18. Blood culture no growth at 48 hours. Urine culture pending.  -Continue Rocephin 1 gm IV every 24 hours. (day #3)  -CBC in am    04/23/2022: WBC down to 13.43, Urine  culture negative  -D/C home today with follow up with PCP this week  -Keflex 500 mg one tab by mouth twice a day x 7 days to complete antibiotic therapy    Hypokalemia  Patient received 20 meq IV while in the ED  -0.45% NS with 20 meq KCl to infuse at 100 ml/hr  -Repeat BMP in AM    04/21/2023: Potassium up to 3.0. Tolerating po fluids. Continues to receive 0.45% NS with 20 KCL at 100 ml/hr.   -Potassium Chl 40 meq po x 1 dose  -BMP in am    04/22/2023: Potassium up to 3.5. BUN/CR up to 23/1.21.   -Continue 0.45% NS with 20 meq KCL at 100 ml/hr  -Encouraged po fluids    04/23/2023: Potassium 3.3, BUN/CR down to 15/0.91  -Potassium Chl 20 meq take one tab by mouth twice a day   -Repeat BMP at hospital follow with PCP    GI  Nausea  Treat with antiemetics   -Zofran 4 mg IV Q 6 hours PRN 1st choice  -Phenergan 25 mg IVPB Q 6 hours PRN 2nd choice.     04/21/2023: No nausea or vomiting today.   -Clear liquid diet, will advance or tolerated.     04/22/2023: Had episodes of nausea, but no vomiting. Will continue with clear liquid diet. If nausea does not return will advance to full liquid diet.    04/23/2023: Nausea and vomiting RESOLVED.        Final Active Diagnoses:    Diagnosis Date Noted POA    PRINCIPAL PROBLEM:  Urinary tract infection without hematuria [N39.0] 08/03/2021 Yes    Hypokalemia [E87.6] 04/20/2023 Yes    HTN (hypertension) [I10] 04/20/2023 Yes    Nausea [R11.0] 04/20/2023 Yes      Problems Resolved During this Admission:       Discharged Condition: good    Disposition: Home or Self Care    Follow Up:   Follow-up Information     [place] , Dr. Ray Follow up on 4/28/2023.    Why: follow up:4-@11:15  Contact information:  [address]  [place] [address]  [phone]           Khanh Trinh MD Follow up in 1 week(s).    Specialties: Family Medicine, Emergency Medicine  Contact information:  [address]  [facility] [place]  [address]  [phone]                       Patient Instructions:      Diet Cardiac     Activity as tolerated       Significant Diagnostic Studies: Labs: All labs within the past 24 hours have been reviewed    Pending Diagnostic Studies:     None         Medications:  Reconciled Home Medications:      Medication List      START taking these medications    cephALEXin 500 MG capsule  Commonly known as: KEFLEX  Take 1 capsule (500 mg total) by mouth every 12 (twelve) hours. for 20 days     metoprolol succinate 25 MG 24 hr tablet  Commonly known as: TOPROL-XL  Take 1 tablet (25 mg total) by mouth once daily.        CONTINUE taking these medications    ondansetron 4 MG tablet  Commonly known as: ZOFRAN  Take 1 tablet (4 mg total) by mouth every 6 (six) hours.     potassium chloride SA 20 MEQ tablet  Commonly known as: K-DUR,KLOR-CON  Take 1 tablet (20 mEq total) by mouth 2 (two) times daily. for 7 days     promethazine 25 MG tablet  Commonly known as: PHENERGAN  Take 1 tablet (25 mg total) by mouth every 6 (six) hours as needed for Nausea.            Indwelling Lines/Drains at time of discharge:   Lines/Drains/Airways     None                 Time spent on the discharge of patient: 30 minutes     Discussed patient's case, labs and medication reconciliation done with Dr. Santillan. He agreed with discharge today.    IRAIDA Topete  Department of Hospital Medicine  [facility] - [facility] Surgical Unit

## 2023-04-23 NOTE — ASSESSMENT & PLAN NOTE
Urine culture pending  -Rocephin 1 gm IVPB Q 24 hours    04/21/2023: Urine culture pending and blood culture pending  -Rocephin 1 gm IV every 24 hours (day #2)  -CBC pending    04/22/2023: WBC still elevated at 19.18. Blood culture no growth at 48 hours. Urine culture pending.  -Continue Rocephin 1 gm IV every 24 hours. (day #3)  -CBC in am    04/23/2022: WBC down to 13.43, Urine culture negative  -D/C home today with follow up with PCP this week  -Keflex 500 mg one tab by mouth twice a day x 7 days to complete antibiotic therapy

## 2023-04-23 NOTE — PLAN OF CARE
Problem: Adult Inpatient Plan of Care  Goal: Readiness for Transition of Care  Outcome: Ongoing, Progressing     Problem: Hypertension Acute  Goal: Blood Pressure Within Desired Range  Outcome: Ongoing, Progressing

## 2023-04-23 NOTE — ASSESSMENT & PLAN NOTE
History of HTN with documented diastolic BP >105 as far back as 2021. Patient is known to be noncompliant with BP treatment. Will monitor BP Q 4 hours and treat accordingly.  -Hydralazine 10 mg IVP Q 4 hours PRN for DBP> 105    04/21/2023: /98 with HR 92. Continues to need Hydralazine prn for elevated blood pressure  -Start Metoprolol Succ 25 mg one tab by mouth daily  -Continue Hydralazine prn      04/22/2023: BP today is 134/87 WITH hr 76. Will continue to monitor BP/HR.    04/23/2023:BP and HR has improved with being on Metoprolol.   -Continue Metoprolol Succ 25 mg take one tab by mouth daily  -Continue to monitor BP and HR

## 2023-04-23 NOTE — ASSESSMENT & PLAN NOTE
Treat with antiemetics   -Zofran 4 mg IV Q 6 hours PRN 1st choice  -Phenergan 25 mg IVPB Q 6 hours PRN 2nd choice.     04/21/2023: No nausea or vomiting today.   -Clear liquid diet, will advance or tolerated.     04/22/2023: Had episodes of nausea, but no vomiting. Will continue with clear liquid diet. If nausea does not return will advance to full liquid diet.    04/23/2023: Nausea and vomiting RESOLVED.

## 2023-04-23 NOTE — NURSING
Pt has not had any c\o pain or NVD  over the past 12 hours. She is tolerating po fluids , jello , and mashed potatoes well.

## 2023-04-24 ENCOUNTER — PATIENT OUTREACH (OUTPATIENT)
Dept: ADMINISTRATIVE | Facility: CLINIC | Age: 43
End: 2023-04-24

## 2023-04-24 NOTE — PROGRESS NOTES
C3 nurse attempted to contact Blanca Campoverde  for a TCC post hospital discharge follow up call. No answer. Left voicemail with callback information. The patient has a scheduled HOSFU appointment with Dr. Ray on 04/28/2023 @ 6103. Unable to route to provider due to Non [facility] provider

## 2023-04-24 NOTE — PLAN OF CARE
[facility]  Discharge Final Note    Primary Care Provider: Khanh Trinh MD    Expected Discharge Date: 4/23/2023    Final Discharge Note (most recent)       Final Note - 04/24/23 0726          Final Note    Assessment Type Final Discharge Note     Anticipated Discharge Disposition Home or Self Care     What phone number can be called within the next 1-3 days to see how you are doing after discharge? [phone]        Post-Acute Status    Discharge Delays None known at this time                 PT dc home.     Important Message from Medicare             Contact Info       [facility] , Dr. Ray    [address]  [place] [address]  [phone]       Next Steps: Follow up on 4/28/2023    Instructions: follow up:4-@11:15    Khanh Trinh MD   Specialty: Family Medicine, Emergency Medicine   Relationship: PCP - General    [address]  [facility] [place] [address]   Phone: [phone]       Next Steps: Follow up in 1 week(s)

## 2023-04-26 LAB
BACTERIA BLD CULT: NORMAL
BACTERIA BLD CULT: NORMAL

## 2023-04-27 ENCOUNTER — PATIENT OUTREACH (OUTPATIENT)
Dept: ADMINISTRATIVE | Facility: HOSPITAL | Age: 43
End: 2023-04-27

## 2023-04-27 NOTE — PROGRESS NOTES
No documentation found in [facility], [facility] [employer], [facility] [address], [facility], or Care Everywhere for pap smear.

## 2023-05-01 ENCOUNTER — OFFICE VISIT (OUTPATIENT)
Dept: FAMILY MEDICINE | Facility: CLINIC | Age: 43
End: 2023-05-01

## 2023-05-01 VITALS
WEIGHT: 172 LBS | RESPIRATION RATE: 20 BRPM | HEIGHT: 61 IN | SYSTOLIC BLOOD PRESSURE: 140 MMHG | HEART RATE: 92 BPM | BODY MASS INDEX: 32.47 KG/M2 | TEMPERATURE: 98 F | OXYGEN SATURATION: 96 % | DIASTOLIC BLOOD PRESSURE: 101 MMHG

## 2023-05-01 DIAGNOSIS — I10 PRIMARY HYPERTENSION: Chronic | ICD-10-CM

## 2023-05-01 DIAGNOSIS — N30.01 ACUTE CYSTITIS WITH HEMATURIA: Primary | ICD-10-CM

## 2023-05-01 PROCEDURE — 99214 OFFICE O/P EST MOD 30 MIN: CPT | Mod: ,,, | Performed by: FAMILY MEDICINE

## 2023-05-01 PROCEDURE — 99214 PR OFFICE/OUTPT VISIT, EST, LEVL IV, 30-39 MIN: ICD-10-PCS | Mod: ,,, | Performed by: FAMILY MEDICINE

## 2023-05-01 RX ORDER — AMLODIPINE BESYLATE 10 MG/1
10 TABLET ORAL DAILY
Qty: 90 TABLET | Refills: 1 | Status: SHIPPED | OUTPATIENT
Start: 2023-05-01 | End: 2024-04-30

## 2023-05-01 NOTE — PROGRESS NOTES
Khanh Trinh MD    [address] [place] [address]     PATIENT NAME: Blanca Campoverde  : 1980  DATE: 23  MRN: [id number]      Billing Provider: Khanh Trinh MD  Level of Service: NC OFFICE/OUTPT VISIT, EST, LEVL IV, 30-39 MIN  Patient PCP Information       Provider PCP Type    Khanh Trinh MD General            Reason for Visit / Chief Complaint: Hypertension, Follow-up (Here today for hospital follow up for headache, nausea, vomiting,diarrhea, hypertensive episodes. Patient does not check blood pressure at home. She reports she has taken meds this am. Reports being treated currently with keflex for UTI.), and mammogram (Patient does not have health insurance. )       Update PCP  Update Chief Complaint         History of Present Illness / Problem Focused Workflow     Blanca Campoverde presents to the clinic with Hypertension, Follow-up (Here today for hospital follow up for headache, nausea, vomiting,diarrhea, hypertensive episodes. Patient does not check blood pressure at home. She reports she has taken meds this am. Reports being treated currently with keflex for UTI.), and mammogram (Patient does not have health insurance. )     HTN - she does not check her blood pressure outside of clinic, she does plan to get a blood pressure monitor,     recently admitted to the hospital for severe UTI, and gastroenteritis. She is doing better form that standpoint         HPI    Review of Systems     Review of Systems   Constitutional:  Negative for activity change, appetite change, chills, fatigue and fever.   HENT:  Negative for nasal congestion, ear pain, hearing loss, postnasal drip and sore throat.    Respiratory:  Negative for cough, chest tightness, shortness of breath and wheezing.    Cardiovascular:  Negative for chest pain, palpitations, leg swelling and claudication.   Gastrointestinal:  Negative for abdominal pain, change in bowel habit,  "constipation, diarrhea, nausea, vomiting and change in bowel habit.   Genitourinary:  Negative for dysuria.   Musculoskeletal:  Negative for arthralgias, back pain, gait problem and myalgias.   Integumentary:  Negative for rash.   Neurological:  Negative for weakness and headaches.   Psychiatric/Behavioral:  Negative for suicidal ideas. The patient is not nervous/anxious.       Medical / Social / Family History     Past Medical History:   Diagnosis Date    Hypertension        Past Surgical History:   Procedure Laterality Date    ADENOIDECTOMY      TUBAL LIGATION         Social History  Ms.  reports that she has never smoked. She has never been exposed to tobacco smoke. She has never used smokeless tobacco. She reports that she does not currently use alcohol. She reports current drug use. Drug: Marijuana.    Family History  Ms.'s family history includes Cancer in her mother.    Medications and Allergies     Medications  Outpatient Medications Marked as Taking for the 5/1/23 encounter (Office Visit) with Khanh Trinh MD   Medication Sig Dispense Refill    cephALEXin (KEFLEX) 500 MG capsule Take 1 capsule (500 mg total) by mouth every 12 (twelve) hours. for 20 days 20 capsule 0    metoprolol succinate (TOPROL-XL) 25 MG 24 hr tablet Take 1 tablet (25 mg total) by mouth once daily. 30 tablet 0       Allergies  Review of patient's allergies indicates:   Allergen Reactions    Darvocet a500 [propoxyphene n-acetaminophen]        Physical Examination     Vitals:    05/01/23 1010   BP: (!) 140/101   Pulse: 92   Resp:    Temp:      Vitals:    05/01/23 1000 05/01/23 1010   BP: (!) 170/121 (!) 140/101   Pulse: 96 92   Resp: 20    Temp: 97.5 °F (36.4 °C)    TempSrc: Oral    SpO2: 96%    Weight: 78 kg (172 lb)    Height: 5' 1" (1.549 m)        Physical Exam  Vitals and nursing note reviewed.   Constitutional:       General: She is not in acute distress.     Appearance: Normal appearance. She is not ill-appearing.   Eyes:     "  Extraocular Movements: Extraocular movements intact.      Pupils: Pupils are equal, round, and reactive to light.   Cardiovascular:      Rate and Rhythm: Normal rate and regular rhythm.      Heart sounds: Normal heart sounds.   Pulmonary:      Effort: Pulmonary effort is normal.      Breath sounds: Normal breath sounds.   Abdominal:      General: Bowel sounds are normal.      Palpations: Abdomen is soft.   Musculoskeletal:         General: Normal range of motion.   Skin:     Findings: No rash.   Neurological:      General: No focal deficit present.      Mental Status: She is alert and oriented to person, place, and time. Mental status is at baseline.   Psychiatric:         Mood and Affect: Mood normal.         Behavior: Behavior normal.          Assessment and Plan (including Health Maintenance)      Problem List  Smart Sets  Document Outside HM   :    Plan:         Health Maintenance Due   Topic Date Due    Hepatitis C Screening  Never done    Lipid Panel  Never done    COVID-19 Vaccine (1) Never done    TETANUS VACCINE  Never done    Mammogram  Never done       Problem List Items Addressed This Visit          Cardiac/Vascular    Primary hypertension (Chronic)    Overview      - Goal blood pressure for most patients is less than 130/85. Both numbers are important. If you have questions about your specific goal blood pressure, please ask at your clinic visits.   - Check blood pressure outside of clinic, record the numbers, and bring the log to all your office visits.   - If you have any chest pain, SOB, or other concerning symptoms, report these immediately, or go to the nearest ER.    - Recommend cardiovascular exercise, at least 3 times per week, for at least 15 minutes.   - Eat a heart healthy diet.            Current Assessment & Plan     Blood pressure remains elevated, no chest pain today, add amlodipine. She will start monitoring her blood pressure outside of clinic.            Relevant Medications     amLODIPine (NORVASC) 10 MG tablet       Renal/    Acute cystitis with hematuria - Primary    Current Assessment & Plan     She is doing much better from this standpoint               Health Maintenance Topics with due status: Not Due       Topic Last Completion Date    Hemoglobin A1c (Diabetic Prevention Screening) 04/18/2023    Influenza Vaccine Not Due       Procedures     No future appointments.     Follow up in about 2 weeks (around 5/15/2023) for chronic health problems, hypertension.     Signature:  Khanh Trinh MD  [address] [place] [address]  Phone #: [phone]  Fax #: [phone]    Date of encounter: 5/1/23    There are no Patient Instructions on file for this visit.

## 2023-05-01 NOTE — ASSESSMENT & PLAN NOTE
Blood pressure remains elevated, no chest pain today, add amlodipine. She will start monitoring her blood pressure outside of clinic.

## 2023-05-30 ENCOUNTER — TELEPHONE (OUTPATIENT)
Dept: FAMILY MEDICINE | Facility: CLINIC | Age: 43
End: 2023-05-30

## 2023-06-13 ENCOUNTER — OFFICE VISIT (OUTPATIENT)
Dept: FAMILY MEDICINE | Facility: CLINIC | Age: 43
End: 2023-06-13

## 2023-06-13 VITALS
WEIGHT: 172.19 LBS | BODY MASS INDEX: 32.51 KG/M2 | DIASTOLIC BLOOD PRESSURE: 88 MMHG | HEART RATE: 78 BPM | HEIGHT: 61 IN | SYSTOLIC BLOOD PRESSURE: 124 MMHG | OXYGEN SATURATION: 99 %

## 2023-06-13 DIAGNOSIS — N30.01 ACUTE CYSTITIS WITH HEMATURIA: Primary | ICD-10-CM

## 2023-06-13 LAB
BILIRUB SERPL-MCNC: NEGATIVE MG/DL
BLOOD URINE, POC: NORMAL
COLOR, POC UA: YELLOW
GLUCOSE UR QL STRIP: NEGATIVE
KETONES UR QL STRIP: NEGATIVE
LEUKOCYTE ESTERASE URINE, POC: NEGATIVE
NITRITE, POC UA: NEGATIVE
PH, POC UA: 6
PROTEIN, POC: NORMAL
SPECIFIC GRAVITY, POC UA: 1.02
UROBILINOGEN, POC UA: 0.2

## 2023-06-13 PROCEDURE — 87086 URINE CULTURE/COLONY COUNT: CPT | Mod: ,,, | Performed by: CLINICAL MEDICAL LABORATORY

## 2023-06-13 PROCEDURE — 87086 CULTURE, URINE: ICD-10-PCS | Mod: ,,, | Performed by: CLINICAL MEDICAL LABORATORY

## 2023-06-13 PROCEDURE — 99213 OFFICE O/P EST LOW 20 MIN: CPT | Mod: ,,, | Performed by: NURSE PRACTITIONER

## 2023-06-13 PROCEDURE — 81003 URINALYSIS AUTO W/O SCOPE: CPT | Mod: QW,,, | Performed by: NURSE PRACTITIONER

## 2023-06-13 PROCEDURE — 99213 PR OFFICE/OUTPT VISIT, EST, LEVL III, 20-29 MIN: ICD-10-PCS | Mod: ,,, | Performed by: NURSE PRACTITIONER

## 2023-06-13 PROCEDURE — 81003 POCT URINALYSIS W/O SCOPE: ICD-10-PCS | Mod: QW,,, | Performed by: NURSE PRACTITIONER

## 2023-06-13 RX ORDER — CEPHALEXIN 500 MG/1
500 CAPSULE ORAL EVERY 12 HOURS
Qty: 6 CAPSULE | Refills: 0 | Status: SHIPPED | OUTPATIENT
Start: 2023-06-13 | End: 2023-06-16

## 2023-06-13 NOTE — PROGRESS NOTES
IRAIDA Morales    [address] [place] [address]     PATIENT NAME: Blanca Campoverde  : 1980  DATE: 23  MRN: [id number]      Billing Provider: IRAIDA Morales  Level of Service: [place] OFFICE/OUTPT VISIT, EST, LEVL III, 20-29 MIN    ASSESSMENT AND PLAN:      Problem List Items Addressed This Visit          Renal/    Acute cystitis with hematuria - Primary    Current Assessment & Plan     Cephalexin 500 mg twice a day for 3 days  Will call with Urine culture results and if antibiotic needs to be changed will let you know  Complete your oral antibiotic as prescribed  Increase water intake daily with goal of 1/2 body weight in ounces of water each day (ex. If you weight 120 lbs, your goal is 60 ounces of water each day)  Always wipe front to back with each void  Stop to void each time you feel the urge  Avoid constipation with increasing fruits and vegetables in your diet  If a urine culture was ordered, you will be notified once the results are reviewed  If symptoms you are experiencing have not improved within three days, return to the clinic for recheck         Relevant Medications    cephALEXin (KEFLEX) 500 MG capsule    Other Relevant Orders    POCT URINALYSIS W/O SCOPE (Completed)    Urine culture       Health Maintenance Topics with due status: Not Due       Topic Last Completion Date    Hemoglobin A1c (Diabetic Prevention Screening) 2023    Influenza Vaccine Not Due     Future Appointments   Date Time Provider Department Center   2023  1:30 PM Khanh Trinh MD [facility]      Follow up for keep follow up with PCP. or sooner as needed.      CHIEF COMPLAINT: Flank Pain (Pt is here with flank pain and increased frequency. She was in the hospital with a UTI and thinks she still has it. Her symptoms stopped for a little bit and came back. )           HISTORY OF PRESENT ILLNESS:  Blanca Campoverde is a 43 y.o. female who  presents to the clinic today     Blanca Campoverde presents to the clinic with Flank Pain (Pt is here with flank pain and increased frequency. She was in the hospital with a UTI and thinks she still has it. Her symptoms stopped for a little bit and came back. )     Flank Pain  This is a recurrent problem. The current episode started in the past 7 days. The problem occurs intermittently. The problem has been waxing and waning since onset. The pain is present in the lumbar spine. The quality of the pain is described as aching. The pain does not radiate. The pain is at a severity of 3/10. The pain is mild. The pain is The same all the time. Associated symptoms include dysuria. Pertinent negatives include no bladder incontinence or bowel incontinence. Risk factors include lack of exercise, sedentary lifestyle and obesity. She has tried nothing for the symptoms. The treatment provided no relief.   Urinary Tract Infection   This is a recurrent problem. The current episode started in the past 7 days. The problem occurs every urination. The problem has been gradually worsening. The quality of the pain is described as aching. The pain is at a severity of 2/10. The pain is mild. There has been no fever. She is Sexually active. There is No history of pyelonephritis. Associated symptoms include flank pain, frequency, hesitancy and urgency. She has tried increased fluids for the symptoms. The treatment provided mild relief. Her past medical history is significant for recurrent UTIs.     PAST MEDICAL HISTORY:      Past Medical History:   Diagnosis Date    Hypertension      PAST SURGICAL HISTORY:  Past Surgical History:   Procedure Laterality Date    ADENOIDECTOMY      TUBAL LIGATION       SOCIAL HISTORY:  Social History     Socioeconomic History    Marital status:    Tobacco Use    Smoking status: Never     Passive exposure: Never    Smokeless tobacco: Never   Substance and Sexual Activity    Alcohol use: Not Currently    Drug  use: Yes     Types: Marijuana    Sexual activity: Yes     Social Determinants of Health     Financial Resource Strain: Low Risk     Difficulty of Paying Living Expenses: Not hard at all   Food Insecurity: No Food Insecurity    Worried About Running Out of Food in the Last Year: Never true    Ran Out of Food in the Last Year: Never true   Transportation Needs: No Transportation Needs    Lack of Transportation (Medical): No    Lack of Transportation (Non-Medical): No   Physical Activity: Inactive    Days of Exercise per Week: 0 days    Minutes of Exercise per Session: 0 min   Stress: No Stress Concern Present    Feeling of Stress : Not at all   Social Connections: Moderately Isolated    Frequency of Communication with Friends and Family: More than three times a week    Frequency of Social Gatherings with Friends and Family: More than three times a week    Attends Tenriism Services: Never    Active Member of Clubs or Organizations: No    Attends Club or Organization Meetings: Never    Marital Status:    Housing Stability: Low Risk     Unable to Pay for Housing in the Last Year: No    Number of Places Lived in the Last Year: 1    Unstable Housing in the Last Year: No     FAMILY HISTORY:       Family History   Problem Relation Age of Onset    Cancer Mother        ALLERGIES AND MEDICATIONS: updated and reviewed.       Review of patient's allergies indicates:   Allergen Reactions    Darvocet a500 [propoxyphene n-acetaminophen]      Medication List with Changes/Refills   New Medications    CEPHALEXIN (KEFLEX) 500 MG CAPSULE    Take 1 capsule (500 mg total) by mouth every 12 (twelve) hours. for 3 days   Current Medications    AMLODIPINE (NORVASC) 10 MG TABLET    Take 1 tablet (10 mg total) by mouth once daily. For BLOOD PRESSURE   Discontinued Medications    METOPROLOL SUCCINATE (TOPROL-XL) 25 MG 24 HR TABLET    Take 1 tablet (25 mg total) by mouth once daily.       SCREENING HISTORY:     Health Maintenance          "Date Due Completion Date    Hepatitis C Screening Never done ---    Lipid Panel Never done ---    COVID-19 Vaccine (1) Never done ---    TETANUS VACCINE Never done ---    Mammogram Never done ---    Cervical Cancer Screening 08/20/2023 (Originally 5/23/2002) ---    Influenza Vaccine (Season Ended) 09/01/2023 ---    Hemoglobin A1c (Diabetic Prevention Screening) 04/18/2026 4/18/2023            REVIEW OF SYSTEMS:   Review of Systems   Gastrointestinal:  Negative for bowel incontinence.   Genitourinary:  Positive for dysuria, flank pain, frequency, hesitancy and urgency. Negative for bladder incontinence.       PHYSICAL EXAM:         /88 (BP Location: Left arm, Patient Position: Sitting, BP Method: Large (Automatic))   Pulse 78   Ht 5' 1" (1.549 m)   Wt 78.1 kg (172 lb 3.2 oz)   LMP 06/04/2023 (Approximate)   SpO2 99%   BMI 32.54 kg/m²  Patient's last menstrual period was 06/04/2023 (approximate).  Wt Readings from Last 3 Encounters:   06/13/23 78.1 kg (172 lb 3.2 oz)   05/01/23 78 kg (172 lb)   04/20/23 75.3 kg (166 lb)     BP Readings from Last 3 Encounters:   06/13/23 124/88   05/01/23 (!) 140/101   04/23/23 (!) 163/113     Estimated body mass index is 32.54 kg/m² as calculated from the following:    Height as of this encounter: 5' 1" (1.549 m).    Weight as of this encounter: 78.1 kg (172 lb 3.2 oz).     Physical Exam  Constitutional:       Appearance: She is obese.   Cardiovascular:      Rate and Rhythm: Normal rate and regular rhythm.      Pulses: Normal pulses.      Heart sounds: Normal heart sounds.   Pulmonary:      Breath sounds: Normal breath sounds.   Skin:     General: Skin is warm.   Neurological:      Mental Status: She is alert and oriented to person, place, and time.   Psychiatric:         Mood and Affect: Mood normal.       LABS:     I have reviewed old labs below:  Lab Results   Component Value Date    WBC 13.43 (H) 04/23/2023    HGB 11.3 (L) 04/23/2023    HCT 34.9 (L) 04/23/2023    MCV " 77.4 (L) 04/23/2023     04/23/2023     04/23/2023    K 3.3 (L) 04/23/2023     04/23/2023    CALCIUM 8.1 (L) 04/23/2023    CO2 24 04/23/2023    GLU 96 04/23/2023    BUN 15 04/23/2023    CREATININE 0.91 04/23/2023    ANIONGAP 15 04/23/2023    PROT 8.7 (H) 04/20/2023    ALBUMIN 4.8 04/20/2023    BILITOT 0.8 04/20/2023    ALKPHOS 81 04/20/2023    ALT 24 04/20/2023    AST 19 04/20/2023    HGBA1C 6.2 04/18/2023           Signature:  Paige Villarreal [address] [place] [address]  Phone #: [phone]  Fax #: [phone]       Date of encounter: 6/13/23    Patient Instructions   Cephalexin 500 mg twice a day for 3 days  Will call with Urine culture results and if antibiotic needs to be changed will let you know  Complete your oral antibiotic as prescribed  Increase water intake daily with goal of 1/2 body weight in ounces of water each day (ex. If you weight 120 lbs, your goal is 60 ounces of water each day)  Always wipe front to back with each void  Stop to void each time you feel the urge  Avoid constipation with increasing fruits and vegetables in your diet  If a urine culture was ordered, you will be notified once the results are reviewed  If symptoms you are experiencing have not improved within three days, return to the clinic for recheck

## 2023-06-13 NOTE — PATIENT INSTRUCTIONS
Cephalexin 500 mg twice a day for 3 days  Will call with Urine culture results and if antibiotic needs to be changed will let you know  Complete your oral antibiotic as prescribed  Increase water intake daily with goal of 1/2 body weight in ounces of water each day (ex. If you weight 120 lbs, your goal is 60 ounces of water each day)  Always wipe front to back with each void  Stop to void each time you feel the urge  Avoid constipation with increasing fruits and vegetables in your diet  If a urine culture was ordered, you will be notified once the results are reviewed  If symptoms you are experiencing have not improved within three days, return to the clinic for recheck

## 2023-06-15 LAB — UA COMPLETE W REFLEX CULTURE PNL UR: NORMAL

## 2023-07-24 PROBLEM — N39.0 URINARY TRACT INFECTION WITHOUT HEMATURIA: Status: RESOLVED | Noted: 2021-08-03 | Resolved: 2023-07-24

## 2024-01-12 ENCOUNTER — PATIENT OUTREACH (OUTPATIENT)
Dept: ADMINISTRATIVE | Facility: HOSPITAL | Age: 44
End: 2024-01-12

## 2024-01-12 NOTE — PROGRESS NOTES
Population Health Chart Review & Patient Outreach Details    January Cervical Cancer Screening Report    Updates Requested / Reviewed:  [x]  Care Team Updated  [x]  Care Everywhere Updated & Reviewed  [x]  [facility] Reviewed  [x]   Reviewed    Health Maintenance Topics Addressed and Outreach Outcomes / Actions Taken:        Cervical Cancer Screening [x] QUYEN sent to Alicia Smith CNM.

## 2024-01-12 NOTE — LETTER
AUTHORIZATION FOR RELEASE OF   CONFIDENTIAL INFORMATION    Dear Alicia Smith CNM,    We are seeing Blanca Campoverde, date of birth 1980, in the clinic at [facility]. Khanh Trinh MD is the patient's PCP. Blanca Campoverde has an outstanding lab/procedure at the time we reviewed her chart. In order to help keep her health information updated, she has authorized us to request the following medical record(s):        (  )  MAMMOGRAM                                      (  )  COLONOSCOPY      ( x )  PAP SMEAR                                          ( x )  LAB RESULTS     (  )  DEXA SCAN                                          (  )  EYE EXAM            (  )  FOOT EXAM                                          (  )  ENTIRE RECORD     (  )  OUTSIDE IMMUNIZATIONS                 (  )  _______________         Please fax records to Maru Mccarthy LPN Care Coordinator at [phone].      If you have any questions, please contact Maru at [phone].           Patient Name: Blanca Campoverde  : 1980  Patient Phone #: [phone]

## 2024-02-05 ENCOUNTER — PATIENT OUTREACH (OUTPATIENT)
Dept: ADMINISTRATIVE | Facility: HOSPITAL | Age: 44
End: 2024-02-05

## 2024-02-05 NOTE — LETTER
AUTHORIZATION FOR RELEASE OF   CONFIDENTIAL INFORMATION    Dear Alicia SAWYER,    We are seeing Blanca Campoverde, date of birth 1980, in the clinic at [facility]. Khanh Trinh MD is the patient's PCP. Blanca Campoverde has an outstanding lab/procedure at the time we reviewed her chart. In order to help keep her health information updated, she has authorized us to request the following medical record(s):        (  )  MAMMOGRAM                                      (  )  COLONOSCOPY      ( x )  PAP SMEAR                                          ( x )  LAB RESULTS     (  )  DEXA SCAN                                          (  )  EYE EXAM            (  )  FOOT EXAM                                          (  )  ENTIRE RECORD     (  )  OUTSIDE IMMUNIZATIONS                 (  )  _______________         Please fax records to Maru Mccarthy LPN Care Coordinator at [phone].      If you have any questions, please contact Maru at [phone].           Patient Name: Blanca Campoverde  : 1980  Patient Phone #: [phone]

## 2024-02-05 NOTE — PROGRESS NOTES
Population Health Chart Review & Patient Outreach Details    January Cervical Cancer Screening Report    Health Maintenance Topics Addressed and Outreach Outcomes / Actions Taken:  Cervical Cancer Screening [x] Second Outreach - QUYEN sent to DIGNA Oconnell.

## 2024-09-17 ENCOUNTER — OFFICE VISIT (OUTPATIENT)
Dept: FAMILY MEDICINE | Facility: CLINIC | Age: 44
End: 2024-09-17

## 2024-09-17 ENCOUNTER — CLINICAL SUPPORT (OUTPATIENT)
Dept: FAMILY MEDICINE | Facility: CLINIC | Age: 44
End: 2024-09-17

## 2024-09-17 VITALS
OXYGEN SATURATION: 98 % | SYSTOLIC BLOOD PRESSURE: 140 MMHG | DIASTOLIC BLOOD PRESSURE: 100 MMHG | TEMPERATURE: 98 F | WEIGHT: 176.38 LBS | RESPIRATION RATE: 20 BRPM | HEART RATE: 80 BPM | HEIGHT: 61 IN | BODY MASS INDEX: 33.3 KG/M2

## 2024-09-17 VITALS — SYSTOLIC BLOOD PRESSURE: 151 MMHG | DIASTOLIC BLOOD PRESSURE: 105 MMHG

## 2024-09-17 DIAGNOSIS — I10 HYPERTENSION, UNSPECIFIED TYPE: Primary | ICD-10-CM

## 2024-09-17 DIAGNOSIS — Z13.220 SCREENING FOR LIPID DISORDERS: ICD-10-CM

## 2024-09-17 DIAGNOSIS — I10 PRIMARY HYPERTENSION: Primary | ICD-10-CM

## 2024-09-17 PROBLEM — R11.0 NAUSEA: Status: RESOLVED | Noted: 2023-04-20 | Resolved: 2024-09-17

## 2024-09-17 PROBLEM — E87.6 HYPOKALEMIA: Status: RESOLVED | Noted: 2023-04-20 | Resolved: 2024-09-17

## 2024-09-17 PROBLEM — N30.01 ACUTE CYSTITIS WITH HEMATURIA: Status: RESOLVED | Noted: 2023-05-01 | Resolved: 2024-09-17

## 2024-09-17 LAB
ALBUMIN SERPL BCP-MCNC: 3.8 G/DL (ref 3.5–5)
ALBUMIN/GLOB SERPL: 0.9 {RATIO}
ALP SERPL-CCNC: 87 U/L (ref 37–98)
ALT SERPL W P-5'-P-CCNC: 15 U/L (ref 13–56)
ANION GAP SERPL CALCULATED.3IONS-SCNC: 14 MMOL/L (ref 7–16)
ANISOCYTOSIS BLD QL SMEAR: ABNORMAL
AST SERPL W P-5'-P-CCNC: 8 U/L (ref 15–37)
BASOPHILS # BLD AUTO: 0.09 K/UL (ref 0–0.2)
BASOPHILS NFR BLD AUTO: 0.7 % (ref 0–1)
BILIRUB SERPL-MCNC: 0.2 MG/DL (ref ?–1.2)
BUN SERPL-MCNC: 16 MG/DL (ref 7–18)
BUN/CREAT SERPL: 21 (ref 6–20)
CALCIUM SERPL-MCNC: 8.9 MG/DL (ref 8.5–10.1)
CHLORIDE SERPL-SCNC: 99 MMOL/L (ref 98–107)
CHOLEST SERPL-MCNC: 225 MG/DL (ref 0–200)
CHOLEST/HDLC SERPL: 3.3 {RATIO}
CO2 SERPL-SCNC: 26 MMOL/L (ref 21–32)
CREAT SERPL-MCNC: 0.75 MG/DL (ref 0.55–1.02)
DIFFERENTIAL METHOD BLD: ABNORMAL
EGFR (NO RACE VARIABLE) (RUSH/TITUS): 101 ML/MIN/1.73M2
EOSINOPHIL # BLD AUTO: 0.21 K/UL (ref 0–0.5)
EOSINOPHIL NFR BLD AUTO: 1.6 % (ref 1–4)
ERYTHROCYTE [DISTWIDTH] IN BLOOD BY AUTOMATED COUNT: 22.8 % (ref 11.5–14.5)
GLOBULIN SER-MCNC: 4.4 G/DL (ref 2–4)
GLUCOSE SERPL-MCNC: 143 MG/DL (ref 74–106)
HCT VFR BLD AUTO: 37.8 % (ref 38–47)
HDLC SERPL-MCNC: 69 MG/DL (ref 40–60)
HGB BLD-MCNC: 10.9 G/DL (ref 12–16)
HYPOCHROMIA BLD QL SMEAR: ABNORMAL
IMM GRANULOCYTES # BLD AUTO: 0.07 K/UL (ref 0–0.04)
IMM GRANULOCYTES NFR BLD: 0.5 % (ref 0–0.4)
LDLC SERPL CALC-MCNC: 114 MG/DL
LDLC/HDLC SERPL: 1.7 {RATIO}
LYMPHOCYTES # BLD AUTO: 3.25 K/UL (ref 1–4.8)
LYMPHOCYTES NFR BLD AUTO: 25.2 % (ref 27–41)
MCH RBC QN AUTO: 19.9 PG (ref 27–31)
MCHC RBC AUTO-ENTMCNC: 28.8 G/DL (ref 32–36)
MCV RBC AUTO: 68.9 FL (ref 80–96)
MICROCYTES BLD QL SMEAR: ABNORMAL
MONOCYTES # BLD AUTO: 0.68 K/UL (ref 0–0.8)
MONOCYTES NFR BLD AUTO: 5.3 % (ref 2–6)
MPC BLD CALC-MCNC: 9.5 FL (ref 9.4–12.4)
NEUTROPHILS # BLD AUTO: 8.62 K/UL (ref 1.8–7.7)
NEUTROPHILS NFR BLD AUTO: 66.7 % (ref 53–65)
NONHDLC SERPL-MCNC: 156 MG/DL
NRBC # BLD AUTO: 0 X10E3/UL
NRBC, AUTO (.00): 0 %
OVALOCYTES BLD QL SMEAR: ABNORMAL
PLATELET # BLD AUTO: 434 K/UL (ref 150–400)
PLATELET MORPHOLOGY: ABNORMAL
POLYCHROMASIA BLD QL SMEAR: ABNORMAL
POTASSIUM SERPL-SCNC: 3.5 MMOL/L (ref 3.5–5.1)
PROT SERPL-MCNC: 8.2 G/DL (ref 6.4–8.2)
RBC # BLD AUTO: 5.49 M/UL (ref 4.2–5.4)
SODIUM SERPL-SCNC: 135 MMOL/L (ref 136–145)
TRIGL SERPL-MCNC: 211 MG/DL (ref 35–150)
TSH SERPL DL<=0.005 MIU/L-ACNC: 1.27 UIU/ML (ref 0.36–3.74)
VLDLC SERPL-MCNC: 42 MG/DL
WBC # BLD AUTO: 12.92 K/UL (ref 4.5–11)

## 2024-09-17 PROCEDURE — 99214 OFFICE O/P EST MOD 30 MIN: CPT | Mod: ,,, | Performed by: NURSE PRACTITIONER

## 2024-09-17 PROCEDURE — 80061 LIPID PANEL: CPT | Mod: ,,, | Performed by: CLINICAL MEDICAL LABORATORY

## 2024-09-17 PROCEDURE — 84443 ASSAY THYROID STIM HORMONE: CPT | Mod: ,,, | Performed by: CLINICAL MEDICAL LABORATORY

## 2024-09-17 PROCEDURE — 80053 COMPREHEN METABOLIC PANEL: CPT | Mod: ,,, | Performed by: CLINICAL MEDICAL LABORATORY

## 2024-09-17 PROCEDURE — 85025 COMPLETE CBC W/AUTO DIFF WBC: CPT | Mod: ,,, | Performed by: CLINICAL MEDICAL LABORATORY

## 2024-09-17 RX ORDER — AMLODIPINE BESYLATE 10 MG/1
10 TABLET ORAL DAILY
Qty: 90 TABLET | Refills: 3 | Status: SHIPPED | OUTPATIENT
Start: 2024-09-17 | End: 2025-09-17

## 2024-09-17 RX ORDER — CLONIDINE HYDROCHLORIDE 0.1 MG/1
0.1 TABLET ORAL
Status: COMPLETED | OUTPATIENT
Start: 2024-09-17 | End: 2024-09-17

## 2024-09-17 RX ORDER — HYDROCHLOROTHIAZIDE 12.5 MG/1
12.5 TABLET ORAL DAILY
Qty: 30 TABLET | Refills: 0 | Status: SHIPPED | OUTPATIENT
Start: 2024-09-17 | End: 2025-09-17

## 2024-09-17 RX ADMIN — CLONIDINE HYDROCHLORIDE 0.1 MG: 0.1 TABLET ORAL at 11:09

## 2024-09-17 NOTE — PATIENT INSTRUCTIONS
Labs pending. Will inform of results when available.   RTC in one week for bp recheck as nurse visit.      1) Check your blood pressure at home. Please notify me at the clinic if the systolic (top number) is consistently over 140 or under 110 or if the diastolic (bottom number) is consistently over 90 or under 60.  2) Regular aerobic physical activity ( e.g. brisk walking) at least 30 min 5 out of 7 days of the week  3) Low sodium diet.  4) Take your meds as directed  5) To the ER for any signs of chest pain/tightness/palpitations/shortness of breath/difficulty speaking/numbness or tingling in face or extremities  6) Have yearly eye exams

## 2024-09-17 NOTE — ASSESSMENT & PLAN NOTE
Labs pending. Will inform of results when available.  Instructed to start on HCTZ 12.5mg daily.    RTC in one week for bp recheck as nurse visit. If continues to be elevated, may increase to 25mg daily.     1) Check your blood pressure at home. Please notify me at the clinic if the systolic (top number) is consistently over 140 or under 110 or if the diastolic (bottom number) is consistently over 90 or under 60.  2) Regular aerobic physical activity ( e.g. brisk walking) at least 30 min 5 out of 7 days of the week  3) Low sodium diet.  4) Take your meds as directed  5) To the ER for any signs of chest pain/tightness/palpitations/shortness of breath/difficulty speaking/numbness or tingling in face or extremities  6) Have yearly eye exams

## 2024-09-17 NOTE — Clinical Note
Hey francisca she says she gets her pap smears done at Dr. Hardin's office and had one last year if we can get those records? Thanks!

## 2024-09-17 NOTE — PROGRESS NOTES
IRAIDA Greenfield   [facility]  [address]  [place] [address]     PATIENT NAME: Blanca Campoverde  : 1980  DATE: 24  MRN: [id number]      Billing Provider: IRAIDA Greenfield  Level of Service:   Patient PCP Information       Provider PCP Type    Primary Doctor No General            Reason for Visit / Chief Complaint: Hypertension (Pt has been on her bp meds for the last ten days and it is not helping at all her bp has been elevated and she had headache backpain fatigue and double vision )   Health Maintenance Due   Topic Date Due    Hepatitis C Screening  Never done    Cervical Cancer Screening  Never done    Lipid Panel  Never done    TETANUS VACCINE  Never done    Mammogram  Never done    Influenza Vaccine (1) Never done    COVID-19 Vaccine ( season) Never done          Update PCP  Update Chief Complaint         History of Present Illness / Problem Focused Workflow     Blanca Campoverde presents to the clinic as a walk in due to elevated bp. She states she has been having increase in her headaches. Pt has been taking her amlodipine daily the last 10 days but before that, she had not been taking. She states at one times she was put on Metoprolol for her BP, but she found out that made her swell due to when she ran out of medication, her feet stopped swelling. She has not had labs done in the system in over a year. She does not have insurance currently but does agree with lab work today. Pt does have a wrist bp cuff with her she has been checking bp with, but states it has been approx 20 points higher than what our readings have been saying. She denies chest pain, sob, dizziness. She states she does have some stress at home that is probably contributing to her BP, but she has been having elevated BP for years. She denies eating a lot of salt/sodium. She states she does get pap smears done per Dr. Hardin and had one last year that was normal. Will get records from them. She states she  has been having abnormal cycles this year and feels like it is due to perimenopause. Declines mammogram at this time. She denies any other needs at this time. NAD noted.     Hemoglobin A1C   Date Value Ref Range Status   04/18/2023 6.2 4.5 - 6.6 % Final     Comment:       Normal:               <5.7%  Pre-Diabetic:       5.7% to 6.4%  Diabetic:             >6.4%  Diabetic Goal:     <7%        CMP  Sodium   Date Value Ref Range Status   04/23/2023 141 136 - 145 mmol/L Final     Potassium   Date Value Ref Range Status   04/23/2023 3.3 (L) 3.5 - 5.1 mmol/L Final     Chloride   Date Value Ref Range Status   04/23/2023 105 98 - 107 mmol/L Final     CO2   Date Value Ref Range Status   04/23/2023 24 21 - 32 mmol/L Final     Glucose   Date Value Ref Range Status   04/23/2023 96 74 - 106 mg/dL Final     BUN   Date Value Ref Range Status   04/23/2023 15 7 - 18 mg/dL Final     Creatinine   Date Value Ref Range Status   04/23/2023 0.91 0.55 - 1.02 mg/dL Final     Calcium   Date Value Ref Range Status   04/23/2023 8.1 (L) 8.5 - 10.1 mg/dL Final     Total Protein   Date Value Ref Range Status   04/20/2023 8.7 (H) 6.4 - 8.2 g/dL Final     Albumin   Date Value Ref Range Status   04/20/2023 4.8 3.5 - 5.0 g/dL Final     Bilirubin, Total   Date Value Ref Range Status   04/20/2023 0.8 >0.0 - 1.2 mg/dL Final     Alk Phos   Date Value Ref Range Status   04/20/2023 81 37 - 98 U/L Final     AST   Date Value Ref Range Status   04/20/2023 19 15 - 37 U/L Final     ALT   Date Value Ref Range Status   04/20/2023 24 13 - 56 U/L Final     Anion Gap   Date Value Ref Range Status   04/23/2023 15 7 - 16 mmol/L Final     eGFR   Date Value Ref Range Status   04/23/2023 81 >=60 mL/min/1.73m² Final        Lab Results   Component Value Date    WBC 13.43 (H) 04/23/2023    RBC 4.51 04/23/2023    HGB 11.3 (L) 04/23/2023    HCT 34.9 (L) 04/23/2023    MCV 77.4 (L) 04/23/2023    MCH 25.1 (L) 04/23/2023    MCHC 32.4 04/23/2023    RDW 15.8 (H) 04/23/2023    PLT  "335 04/23/2023    MPV 9.8 04/23/2023    LYMPH 29.9 04/23/2023    LYMPH 4.02 04/23/2023    MONO 6.8 (H) 04/23/2023    EOS 0.18 04/23/2023    BASO 0.05 04/23/2023    EOSINOPHIL 1.3 04/23/2023    BASOPHIL 0.4 04/23/2023        No results found for: "CHOL"  No results found for: "HDL"  No results found for: "LDLCALC"  No results found for: "TRIG"  No results found for: "CHOLHDL"     Wt Readings from Last 3 Encounters:   09/17/24 1051 80 kg (176 lb 6.4 oz)   06/13/23 1527 78.1 kg (172 lb 3.2 oz)   05/01/23 1000 78 kg (172 lb)        BP Readings from Last 3 Encounters:   09/17/24 (!) 140/100   09/17/24 (!) 151/105   06/13/23 124/88        Review of Systems     Review of Systems   Constitutional: Negative.    Respiratory: Negative.     Cardiovascular: Negative.    Gastrointestinal: Negative.    Endocrine: Negative.    Genitourinary: Negative.  Positive for menstrual irregularity.   Musculoskeletal: Negative.    Integumentary:  Negative.   Allergic/Immunologic: Negative.    Neurological:  Positive for headaches.   Hematological: Negative.    Psychiatric/Behavioral: Negative.          Medical / Social / Family History     Past Medical History:   Diagnosis Date    Acute cystitis with hematuria 05/01/2023    Hypertension        Past Surgical History:   Procedure Laterality Date    ADENOIDECTOMY      TUBAL LIGATION         Social History  Ms.  reports that she has never smoked. She has never been exposed to tobacco smoke. She has never used smokeless tobacco. She reports that she does not currently use alcohol. She reports current drug use. Drug: Marijuana.    Family History  Ms.'s family history includes Cancer in her mother.    Medications and Allergies     Medications  No outpatient medications have been marked as taking for the 9/17/24 encounter (Office Visit) with Lyndsay Huddleston FNP.     Current Facility-Administered Medications for the 9/17/24 encounter (Office Visit) with Lyndsay Huddleston FNP   Medication Dose Route " "Frequency Provider Last Rate Last Admin    [COMPLETED] cloNIDine tablet 0.1 mg  0.1 mg Oral 1 time in Clinic/HOD Lyndsay Huddleston FNP   0.1 mg at 09/17/24 1113       Allergies  Review of patient's allergies indicates:   Allergen Reactions    Darvocet a500 [propoxyphene n-acetaminophen]        Physical Examination     Vitals:    09/17/24 1051 09/17/24 1054 09/17/24 1140 09/17/24 1211   BP: (!) 166/115 (!) 151/105 (!) 153/105 (!) 140/100   BP Location: Left arm Right arm Left arm    Patient Position: Sitting Sitting Sitting    BP Method: Medium (Automatic) Large (Automatic) Large (Automatic)    Pulse: 80      Resp: 20      Temp: 97.9 °F (36.6 °C)      TempSrc: Oral      SpO2: 98%      Weight: 80 kg (176 lb 6.4 oz)      Height: 5' 1" (1.549 m)         Physical Exam  Constitutional:       Appearance: Normal appearance. She is obese.   HENT:      Head: Normocephalic.   Eyes:      Extraocular Movements: Extraocular movements intact.   Cardiovascular:      Rate and Rhythm: Normal rate and regular rhythm.      Pulses: Normal pulses.      Heart sounds: Normal heart sounds.   Pulmonary:      Effort: Pulmonary effort is normal.      Breath sounds: Normal breath sounds.   Skin:     General: Skin is warm and dry.      Capillary Refill: Capillary refill takes less than 2 seconds.   Neurological:      General: No focal deficit present.      Mental Status: She is alert and oriented to person, place, and time.   Psychiatric:         Mood and Affect: Mood normal.         Behavior: Behavior normal.          Assessment and Plan (including Health Maintenance)      Problem List  Smart Sets  Document Outside HM   :    Plan:     Patient Instructions   Labs pending. Will inform of results when available.   RTC in one week for bp recheck as nurse visit.      1) Check your blood pressure at home. Please notify me at the clinic if the systolic (top number) is consistently over 140 or under 110 or if the diastolic (bottom number) is consistently " over 90 or under 60.  2) Regular aerobic physical activity ( e.g. brisk walking) at least 30 min 5 out of 7 days of the week  3) Low sodium diet.  4) Take your meds as directed  5) To the ER for any signs of chest pain/tightness/palpitations/shortness of breath/difficulty speaking/numbness or tingling in face or extremities  6) Have yearly eye exams          Health Maintenance Due   Topic Date Due    Hepatitis C Screening  Never done    Cervical Cancer Screening  Never done    Lipid Panel  Never done    TETANUS VACCINE  Never done    Mammogram  Never done    Influenza Vaccine (1) Never done    COVID-19 Vaccine (1 - 2023-24 season) Never done       Problem List Items Addressed This Visit       Hypertension - Primary    Current Assessment & Plan     Labs pending. Will inform of results when available.  Instructed to start on HCTZ 12.5mg daily.    RTC in one week for bp recheck as nurse visit. If continues to be elevated, may increase to 25mg daily.     1) Check your blood pressure at home. Please notify me at the clinic if the systolic (top number) is consistently over 140 or under 110 or if the diastolic (bottom number) is consistently over 90 or under 60.  2) Regular aerobic physical activity ( e.g. brisk walking) at least 30 min 5 out of 7 days of the week  3) Low sodium diet.  4) Take your meds as directed  5) To the ER for any signs of chest pain/tightness/palpitations/shortness of breath/difficulty speaking/numbness or tingling in face or extremities  6) Have yearly eye exams           Relevant Medications    cloNIDine tablet 0.1 mg (Completed)    hydroCHLOROthiazide (HYDRODIURIL) 12.5 MG Tab    amLODIPine (NORVASC) 10 MG tablet    Other Relevant Orders    POCT EKG 12-LEAD (Manually Resulted by Ordering Provider)    CBC Auto Differential    Comprehensive Metabolic Panel    TSH    Screening for lipid disorders    Current Assessment & Plan     Labs pending. Will inform of results when available.          Relevant  Orders    Lipid Panel     Hypertension, unspecified type  -     POCT EKG 12-LEAD (Manually Resulted by Ordering Provider)  -     cloNIDine tablet 0.1 mg  -     CBC Auto Differential; Future; Expected date: 09/17/2024  -     Comprehensive Metabolic Panel; Future; Expected date: 09/17/2024  -     TSH; Future; Expected date: 09/17/2024  -     hydroCHLOROthiazide (HYDRODIURIL) 12.5 MG Tab; Take 1 tablet (12.5 mg total) by mouth once daily.  Dispense: 30 tablet; Refill: 0    Screening for lipid disorders  -     Lipid Panel; Future; Expected date: 09/17/2024    Primary hypertension  -     amLODIPine (NORVASC) 10 MG tablet; Take 1 tablet (10 mg total) by mouth once daily. For BLOOD PRESSURE  Dispense: 90 tablet; Refill: 3       Health Maintenance Topics with due status: Not Due       Topic Last Completion Date    Hemoglobin A1c (Diabetic Prevention Screening) 04/18/2023         Future Appointments   Date Time Provider Department Center   9/17/2024  1:00 PM Lyndsay Huddleston FNP [facility]        Follow up if symptoms worsen or fail to improve.    Health Maintenance Due   Topic Date Due    Hepatitis C Screening  Never done    Cervical Cancer Screening  Never done    Lipid Panel  Never done    TETANUS VACCINE  Never done    Mammogram  Never done    Influenza Vaccine (1) Never done    COVID-19 Vaccine (1 - 2023-24 season) Never done        Signature:  IRAIDA Greenfield    Date of encounter: 9/17/24

## 2024-09-18 NOTE — PROGRESS NOTES
Please let pt know labs are good. Her WBC was slightly elevated, but this looks to be chronic so I would just recommend monitoring this as she was not having any symptoms of this yesterday. She is showing to be anemic, but she does have heavy cycles which could be the cause of this. She could take an otc iron supplement daily to help with this. Sodium, potassium, kidney/liver function are normal. Cholesterol is showing to be elevated, but I have  calculated her risk score of having a major heart event such as a heart attack or stroke over the next 10 years, and is very low at 0.5%. We usually treat this with rx medication if it is over 7.5% so this is good for now. Thyroid level is normal. You can also see if pt has checked BP to see how it is doing. She is supposed to come back one day next week for BP check as nurse visit. Thanks!

## 2024-09-19 ENCOUNTER — PATIENT OUTREACH (OUTPATIENT)
Facility: HOSPITAL | Age: 44
End: 2024-09-19

## 2024-09-19 NOTE — LETTER
AUTHORIZATION FOR RELEASE OF   CONFIDENTIAL INFORMATION    Dear ,    We are seeing Blanca Campoverde, date of birth 1980, in the clinic at [facility]. Lyndsay Huddleston FNP is the patient's PCP. Blanca Campoverde has an outstanding lab/procedure at the time we reviewed her chart. In order to help keep her health information updated, she has authorized us to request the following medical record(s):        (  )  MAMMOGRAM                                      (  )  COLONOSCOPY      ( x )  PAP SMEAR                                          ( x )  OUTSIDE LAB RESULTS     (  )  DEXA SCAN                                          (  )  EYE EXAM            (  )  FOOT EXAM                                          (  )  ENTIRE RECORD     (  )  OUTSIDE IMMUNIZATIONS                 (  )  _______________         Please fax records to [facility], [place], Sierra, FNP, [phone]     If you have any questions, please contact irene Pope at [phone].           Patient Name: Blanca Campoverde  : 1980  Patient Phone #: [phone]

## 2024-09-19 NOTE — PROGRESS NOTES
2024       Jennifer Marte MD  [address]  [place] [address]  Via Fax: [phone]      Patient: Parish Sampson   YOB: 2017   Date of Visit: 3/13/2024       Dear Dr. Marte:    Thank you for referring Parish Sampson to me for evaluation. Below are my notes for this visit with him.    If you have questions, please do not hesitate to call me. I look forward to following your patient along with you.      Sincerely,        [facility] NURSE        CC: Francis Moralez MD  3/14/2024  4:44 PM  Signed  PEDS.NEPHROLOGY CLINIC NOTE      NAME: Parish Sampson   MRN: [id number]  : 2017    Visit Type: follow-up of postinfectious glomerulonephritis.     Identification / Chief Complaint:  Source(s) of Information: Parent(s) and Patient    HISTORY OF PRESENT ILLNESS:    He was admitted to the hospital  on 2023 for the following HPI: Parish is a 6 year old male with recent strep pharyngitis treated with amoxicillin a week ago, who presented with fever, tea-colored urine, edema, lethargy, abdominal distension, and recurrent emesis. Parents were giving ibuprofen and tylenol till Wednesday. He has not been eating as usual.       He was brought to the ED where his labs showed  mg/dl, Cr 1.03 mg/dl, Na 135, K 4.9, Ca 8.3 mg/dl, Hbg 9.1 g/dl, albumin 2.4 mg/dl, and UA showed protein 200 mg/dl, SG 1.030, BLOOD 3+. And leuko  25/hpf. He was given Fluid bolus in the ED.     He has been in respiratory distress required high flow O2 20 l/min. Blood pressure were elevated 120/90 mmHg. Blood pressure improved to 104/61 mmHg. Repeat labs upon arrival to the PICU: improvement in the BUN level 121 mg/dl, Cr 0.97 mg/dl, hypoalbuminemia 2.4 g/dl, mild hyperphosphatemia 6.4 mg/dl, and Ca 8.2 mg/dl, normal K and Na level, mild hyperchloremia 113 mEq/L and mild low total bicarbonate 16 mEq/l. CBC was remarkable for anemia with Hgb 8.8 g/dl and normal plt count. UPC was  Population Health Chart Review & Patient Outreach Details      Further Action Needed If Patient Returns Outreach:            Updates Requested / Reviewed:     [x]  Care Everywhere    [x]     []  External Sources ([facility], [facility], [facility], etc.)    [] [facility]   [] [facility]   [] Other:    [x]  Care Team Updated   []  Removed  or Duplicate Orders   []  Immunization Reconciliation Completed / Queried    [] [place]   [] [place]   [] [place]   [] [place]      Health Maintenance Topics Addressed and Outreach Outcomes / Actions Taken:             Breast Cancer Screening []  Mammogram Order Placed    []  Mammogram Screening Scheduled    []  External Records Requested & Care Team Updated if Applicable    []  External Records Uploaded & Care Team Updated if Applicable    []  Pt Declined Scheduling Mammogram    []  Pt Will Schedule with External Provider / Order Routed & Care Team Updated if Applicable              Cervical Cancer Screening []  Pap Smear Scheduled in Primary Care or OBGYN    [x]  External Records Requested & Care Team Updated if Applicable       []  External Records Uploaded, Care Team Updated, & History Updated if Applicable    []  Patient Declined Scheduling Pap Smear    []  Patient Will Schedule with External Provider & Care Team Updated if Applicable                  Colorectal Cancer Screening []  Colonoscopy Case Request / Referral / Home Test Order Placed    []  External Records Requested & Care Team Updated if Applicable    []  External Records Uploaded, Care Team Updated, & History Updated if Applicable    []  Patient Declined Completing Colon Cancer Screening    []  Patient Will Schedule with External Provider & Care Team Updated if Applicable    []  Fit Kit Mailed (add the SmartPhrase under additional notes)    []  Reminded Patient to Complete Home Test                Diabetic Eye Exam []  Eye Exam Screening Order Placed    []  Eye Camera Scheduled or Optometry/Ophthalmology Referral  Placed    []  External Records Requested & Care Team Updated if Applicable    []  External Records Uploaded, Care Team Updated, & History Updated if Applicable    []  Patient Declined Scheduling Eye Exam    []  Patient Will Schedule with External Provider & Care Team Updated if Applicable             Blood Pressure Control []  Primary Care Follow Up Visit Scheduled     []  Remote Blood Pressure Reading Captured    []  Patient Declined Remote Reading or Scheduling Appt - Escalated to PCP    []  Patient Will Call Back or Send Portal Message with Reading                 HbA1c & Other Labs []  Overdue Lab(s) Ordered    []  Overdue Lab(s) Scheduled    []  External Records Uploaded & Care Team Updated if Applicable    []  Primary Care Follow Up Visit Scheduled     []  Reminded Patient to Complete A1c Home Test    []  Patient Declined Scheduling Labs or Will Call Back to Schedule    []  Patient Will Schedule with External Provider / Order Routed, & Care Team Updated if Applicable           Primary Care Appointment []  Primary Care Appt Scheduled    []  Patient Declined Scheduling or Will Call Back to Schedule    []  Pt Established with External Provider, Updated Care Team, & Informed Pt to Notify Payor if Applicable           Medication Adherence /    Statin Use []  Primary Care Appointment Scheduled    []  Patient Reminded to  Prescription    []  Patient Declined, Provider Notified if Needed    []  Sent Provider Message to Review to Evaluate Pt for Statin, Add Exclusion Dx Codes, Document   Exclusion in Problem List, Change Statin Intensity Level to Moderate or High Intensity if Applicable                Osteoporosis Screening []  Dexa Order Placed    []  Dexa Appointment Scheduled    []  External Records Requested & Care Team Updated    []  External Records Uploaded, Care Team Updated, & History Updated if Applicable    []  Patient Declined Scheduling Dexa or Will Call Back to Schedule    []  Patient Will Schedule  elevated 15.568 mg/mg Cr which is considered nephrotic range proteinuria.      Of note: he was born at term 38w GA with appropriate birth weight for the GA. He has been up to date for immunizations.       Highest serum creatinine was 1.33 mg/dl on 12/21/2023 which has improved to 0.40 mg/dl upon discharge.   UPC was 15 mg/mg cr upon admission and serum albumin went down to 2 g/dl.   Received methylprednisolone  mg daily on 12/202/23 for 3 days then start on prednisone 30 mg daily on 12/23/2023  Repeat labs on 01/08/2024 showed normalized serum creatinine 0.45 mg/dl, normal lytes, and improvement in serum albumin 3 g/dl.Serum complement C3 normalized 123 mg/dl and C4 22.8 mg/dl.   Renal biopsy 12/20/2023:   Microscopic Description  Kindra Alfaro   A.  Multiple paraffin sections of renal cortical and medullary parenchyma containing up to 19 glomeruli on PAS-stained sections are examined. None of the glomeruli are globally sclerosed. The glomeruli are hypercellular, with lobular accentuation of the capillaries and diffuse, mostly global endocapillary hypercellularity with exudates of neutrophils. Necrosis and crescents are not seen. There is diffuse interstitial edema and the tubules are dilated with foci of epithelial simplification, apical blebbing and shedding. Focal red cell casts are noted. There is a focal and minimal interstitial infiltrate of inflammatory cells including lymphocytes, plasma cells and eosinophils. There is no tubular atrophy and interstitial fibrosis. The extraglomerular blood vessels are not remarkable. H&E x2, PAS x2, Reed silver and trichrome stains are reviewed.     B.  Frozen sections of renal parenchyma containing 23 non-sclerosed glomeruli are incubated with the following fluorochrome-conjugated antibodies: IgG, IgA, IgM, C3, C1q, fibrinogen and albumin. Immunofluorescence examination reveals capillary wall staining with a granular to garland-like pattern as well as mesangial  granular staining for IgG (2+) and C3 (3+), and granular capillary wall and mesangial staining for IgM (trace+) and IgA (1+). C1q and fibrinogen reveal non-specific staining of the glomeruli. Albumin shows 1+ normal pseudolinear staining of the glomerular and tubular basement membranes with focal 3+ staining of tubular cytoplasmic droplets. The positive and negative tissue controls were appropriately positive and non-reactive, respectively.        C.  Review of toluidine blue-stained plastic semithin sections of tissue processed from one of two blocks for electron microscopy reveals eighteen non-obsolescent glomeruli. Ultrastructural examination demonstrates diffuse effacement of epithelial foot processes with presence of numerous subepithelial electron dense deposits, few taller than wide consistent with humps, as well as few small subendothelial deposits.  Tubular reticular structures are not seen.       Interval history:   He has been doing well. He has been off prednisone for almost a week ago. Tolerating lisinopril 5 mg daily with no side effects. No gross hematuria, edema, or UTIs. Good po intake.   Review of Systems:   A complete 14-point review of systems is negative, except as noted in the HPI.    Medications Orders:   Current Outpatient Medications   Medication Sig Dispense Refill   • lisinopril (ZESTRIL) 2.5 MG tablet Take 1 tablet by mouth daily. 90 tablet 1   • predniSONE (DELTASONE) 10 MG tablet Take 2 tablets by mouth daily. (Patient not taking: Reported on 3/13/2024) 90 tablet 3   • famotidine (PEPCID) 40 MG/5ML suspension Take 1.2 mLs by mouth daily. (Patient not taking: Reported on 3/13/2024) 50 mL 3   • famotidine (PEPCID) 10 MG tablet Take 1 tablet by mouth daily. (Patient not taking: Reported on 3/13/2024) 30 tablet 0     No current facility-administered medications for this visit.        PAST HISTORY (Medical, Surgical, Family, etc):     ALLERGIES:  ALLERGIES:  No Known Allergies     Past  with External Provider / Order Routed & Care Team Updated if Applicable       Additional Notes:            Medical History:   Diagnosis Date   • Acute glomerulonephritis 2023       History reviewed. No pertinent surgical history.    History reviewed. No pertinent family history.    Pediatric History   Patient Parents   • Camilla,Nikita (Father)     Other Topics Concern   • Not on file   Social History Narrative   • Not on file           Vital Signs (24 h):    Visit Vitals  BP (!) 81/40 (BP Location: LUE - Left upper extremity, Patient Position: Sitting, Cuff Size: Pediatric)   Pulse 102   Ht 3' 7.31\" (1.1 m)   Wt 18.6 kg (41 lb 1.9 oz)   BMI 15.41 kg/m²       Blood pressure %karli are 12 % systolic and 9 % diastolic based on the 2017 AAP Clinical Practice Guideline. Blood pressure %ile targets: 90%: 105/65, 95%: 109/69, 95% + 12 mmH/81. This reading is in the normal blood pressure range.    Weight    24 1443   Weight: 18.6 kg (41 lb 1.9 oz)       Vitals:    24 1443 24 1446   BP: 88/47 (!) 81/40   BP Location: LUE - Left upper extremity LUE - Left upper extremity   Patient Position: Sitting Sitting   Cuff Size: Pediatric Pediatric   Pulse: 99 102   Weight: 18.6 kg (41 lb 1.9 oz)    Height: 3' 7.31\" (1.1 m)        PHYSICAL EXAM:     General: no acute distress, appears overall well  Head: NC/AT, AFOSF  Eyes: conjunctiva clear without icterus  ENT/Mouth: mucous membranes moist  Neck: supple, no LAD  Lungs: breath sounds clear to auscultation, no distress, no retractions  Cardiac: reg rate & rhythm, NL S1 & S2, no murmurs  GI/Abdomen: soft, nontender, nondistended  : NL external genitalia  Extremities: warm, no edema  Neurologic: awake, alert, no focal deficits / grossly intact  Skin:  no rashes / lesions.       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 He has been doing well overall since  last visit. He has been off of prednisone for a week.     Repeat first am UPC and UAC improved substantially. UA is still showing microscopic hematuria which we discussed with the parents that microscopic hematuria might last up to 2 years after the initial episode.  Last serum creatinine level was 0.45 mg/dl in Jan 2024 and C3 complement level was normalized in Jan 2024.     Blood pressure was normal today- low normal.       Plan:  - Will decrease lisinopril 2.5 mg daily  - Will repeat first am UA with micro, UPC, and microalbuminuria in 4-6 weeks.   - f/u in 3 months.     Orders Placed This Encounter   • Urinalysis with microscopy without culture   • Protein/Creatinine Ratio, Urine   • Microalbumin Urine Random   • lisinopril (ZESTRIL) 2.5 MG tablet       I have discussed my assessment and plan with the referring physician, primary care provider, parents, and patient.  Time spent on visit > 40 minutes.  This includes pre-charting, chart review, and documenting. counseling and education with patient and family    Referring Provider: Jennifer Marte MD, [address] / [place] [address], [phone], [phone]   Primary Care Provider: Jennifer Marte MD, [address] / [place] [address], [phone], [phone]      Note completed by: Francis Ruiz MD   03/14/24

## 2024-09-20 ENCOUNTER — CLINICAL SUPPORT (OUTPATIENT)
Dept: FAMILY MEDICINE | Facility: CLINIC | Age: 44
End: 2024-09-20

## 2024-09-20 VITALS — SYSTOLIC BLOOD PRESSURE: 154 MMHG | DIASTOLIC BLOOD PRESSURE: 120 MMHG

## 2024-09-20 DIAGNOSIS — I10 HYPERTENSION, UNSPECIFIED TYPE: Primary | ICD-10-CM

## 2024-09-20 NOTE — PROGRESS NOTES
Pt came in to check her bp machine against our bp cuff. Pt has been taking hctz for 5 days now and her bp is still very high 159/120. We gave her a BP log and increased her HCTZ to 25 mg a day from 12.5 a day pt will call Tuesday if BP are not going lower and get an appt for WED or THURSDAY to see what else we can try or to get her referred to Cardiology.

## 2024-10-02 DIAGNOSIS — I10 PRIMARY HYPERTENSION: Primary | ICD-10-CM

## 2024-10-02 RX ORDER — HYDROCHLOROTHIAZIDE 25 MG/1
25 TABLET ORAL DAILY
Qty: 90 TABLET | Refills: 3 | Status: SHIPPED | OUTPATIENT
Start: 2024-10-02 | End: 2025-10-02

## 2024-10-04 ENCOUNTER — OFFICE VISIT (OUTPATIENT)
Dept: FAMILY MEDICINE | Facility: CLINIC | Age: 44
End: 2024-10-04

## 2024-10-04 VITALS
SYSTOLIC BLOOD PRESSURE: 136 MMHG | OXYGEN SATURATION: 96 % | RESPIRATION RATE: 18 BRPM | DIASTOLIC BLOOD PRESSURE: 100 MMHG | BODY MASS INDEX: 33.35 KG/M2 | TEMPERATURE: 98 F | HEART RATE: 98 BPM | HEIGHT: 61 IN | WEIGHT: 176.63 LBS

## 2024-10-04 DIAGNOSIS — I10 PRIMARY HYPERTENSION: Primary | ICD-10-CM

## 2024-10-04 DIAGNOSIS — R52 BODY ACHES: ICD-10-CM

## 2024-10-04 DIAGNOSIS — R53.83 FATIGUE, UNSPECIFIED TYPE: ICD-10-CM

## 2024-10-04 DIAGNOSIS — R21 RASH: ICD-10-CM

## 2024-10-04 DIAGNOSIS — J01.00 ACUTE MAXILLARY SINUSITIS, RECURRENCE NOT SPECIFIED: ICD-10-CM

## 2024-10-04 DIAGNOSIS — R50.9 FEVER, UNSPECIFIED FEVER CAUSE: ICD-10-CM

## 2024-10-04 LAB
BILIRUB SERPL-MCNC: NORMAL MG/DL
BLOOD URINE, POC: NORMAL
CLARITY, UA: CLEAR
COLOR, UA: YELLOW
CTP QC/QA: YES
GLUCOSE UR QL STRIP: NORMAL
KETONES UR QL STRIP: NORMAL
LEUKOCYTE ESTERASE URINE, POC: NEGATIVE
MOLECULAR STREP A: NEGATIVE
NITRITE, POC UA: NORMAL
PH, POC UA: 6
POC MOLECULAR INFLUENZA A AGN: NEGATIVE
POC MOLECULAR INFLUENZA B AGN: NEGATIVE
PROTEIN, POC: 30
SARS-COV-2 RDRP RESP QL NAA+PROBE: NEGATIVE
SPECIFIC GRAVITY, POC UA: 1.02
UROBILINOGEN, POC UA: 0.2

## 2024-10-04 RX ORDER — LOSARTAN POTASSIUM 25 MG/1
25 TABLET ORAL DAILY
Qty: 90 TABLET | Refills: 3 | Status: SHIPPED | OUTPATIENT
Start: 2024-10-04 | End: 2025-10-04

## 2024-10-04 RX ORDER — AMOXICILLIN AND CLAVULANATE POTASSIUM 875; 125 MG/1; MG/1
1 TABLET, FILM COATED ORAL EVERY 12 HOURS
Qty: 20 TABLET | Refills: 0 | Status: SHIPPED | OUTPATIENT
Start: 2024-10-04 | End: 2024-10-14

## 2024-10-04 NOTE — PROGRESS NOTES
"   IRAIDA Greenfield   [facility] [place]  [address]  [place] [address]     PATIENT NAME: Blanca Campoverde  : 1980  DATE: 10/4/24  MRN: [id number]      Billing Provider: IRAIDA Greenfield  Level of Service:   Patient PCP Information       Provider PCP Type    IRAIDA Greenfield General            Reason for Visit / Chief Complaint: Fatigue (Had menstrual cycle and it stopped and started back 2 days later and has been light. /Also has weakness, lethargic, feet swelling, headaches all joints hurt and has a rash on arms. )   Health Maintenance Due   Topic Date Due    Hepatitis C Screening  Never done    Cervical Cancer Screening  Never done    TETANUS VACCINE  Never done    Mammogram  Never done    Influenza Vaccine (1) Never done    COVID-19 Vaccine ( season) Never done          Update PCP  Update Chief Complaint         History of Present Illness / Problem Focused Workflow     Blanca Campoverde presents to the clinic as a walk in with SO with multiple complaints of headaches, fever, bodyaches, joint pain. She does also have a rash to her upper arms, but she thinks this is from her helping clean her daughter's house. She has also had productive cough with yellow sputum, more "boogers" than usual, and more was in her ears. She came originally to bring her bp log she had been keeping up with and bp has been better, but still elevated. BP log scanned in to chart. Pt thinks it may be the increase in her bp med HCTZ as she started feeling this way when she increased it. She has "swelling" in her ankles, but her ankles are not swollen in clinic. She also states she smelt "burnt hair" earlier today and when she looked it up on line it said she could either be having a seizure or a brain tumor. She states she is "dying". Pt has admitted to smoking marijuana in the past. I offered to obtain labs and ct scan today, but will not get results back until Monday at least. Pt does not have insurance so " everything is self pay. Pt does have blood in her urine but she is currently on her cycle and thinks she may also be going through perimenopause. Informed pt she needs to go to ER over the weekend if symptoms gets worse. Pt vu. She denies any other needs at this time. NAD noted.     Hemoglobin A1C   Date Value Ref Range Status   04/18/2023 6.2 4.5 - 6.6 % Final     Comment:       Normal:               <5.7%  Pre-Diabetic:       5.7% to 6.4%  Diabetic:             >6.4%  Diabetic Goal:     <7%        CMP  Sodium   Date Value Ref Range Status   09/17/2024 135 (L) 136 - 145 mmol/L Final     Potassium   Date Value Ref Range Status   09/17/2024 3.5 3.5 - 5.1 mmol/L Final     Chloride   Date Value Ref Range Status   09/17/2024 99 98 - 107 mmol/L Final     CO2   Date Value Ref Range Status   09/17/2024 26 21 - 32 mmol/L Final     Glucose   Date Value Ref Range Status   09/17/2024 143 (H) 74 - 106 mg/dL Final     BUN   Date Value Ref Range Status   09/17/2024 16 7 - 18 mg/dL Final     Creatinine   Date Value Ref Range Status   09/17/2024 0.75 0.55 - 1.02 mg/dL Final     Calcium   Date Value Ref Range Status   09/17/2024 8.9 8.5 - 10.1 mg/dL Final     Total Protein   Date Value Ref Range Status   09/17/2024 8.2 6.4 - 8.2 g/dL Final     Albumin   Date Value Ref Range Status   09/17/2024 3.8 3.5 - 5.0 g/dL Final     Bilirubin, Total   Date Value Ref Range Status   09/17/2024 0.2 >0.0 - 1.2 mg/dL Final     Alk Phos   Date Value Ref Range Status   09/17/2024 87 37 - 98 U/L Final     AST   Date Value Ref Range Status   09/17/2024 8 (L) 15 - 37 U/L Final     ALT   Date Value Ref Range Status   09/17/2024 15 13 - 56 U/L Final     Anion Gap   Date Value Ref Range Status   09/17/2024 14 7 - 16 mmol/L Final     eGFR   Date Value Ref Range Status   09/17/2024 101 >=60 mL/min/1.73m2 Final        Lab Results   Component Value Date    WBC 12.92 (H) 09/17/2024    RBC 5.49 (H) 09/17/2024    HGB 10.9 (L) 09/17/2024    HCT 37.8 (L) 09/17/2024     MCV 68.9 (L) 09/17/2024    MCH 19.9 (L) 09/17/2024    MCHC 28.8 (L) 09/17/2024    RDW 22.8 (H) 09/17/2024     (H) 09/17/2024    MPV 9.5 09/17/2024    LYMPH 25.2 (L) 09/17/2024    LYMPH 3.25 09/17/2024    MONO 5.3 09/17/2024    EOS 0.21 09/17/2024    BASO 0.09 09/17/2024    EOSINOPHIL 1.6 09/17/2024    BASOPHIL 0.7 09/17/2024        Lab Results   Component Value Date    CHOL 225 (H) 09/17/2024     Lab Results   Component Value Date    HDL 69 (H) 09/17/2024     Lab Results   Component Value Date    LDLCALC 114 09/17/2024     Lab Results   Component Value Date    TRIG 211 (H) 09/17/2024     Lab Results   Component Value Date    CHOLHDL 3.3 09/17/2024        Wt Readings from Last 3 Encounters:   10/04/24 1536 80.1 kg (176 lb 9.6 oz)   09/17/24 1051 80 kg (176 lb 6.4 oz)   06/13/23 1527 78.1 kg (172 lb 3.2 oz)        BP Readings from Last 3 Encounters:   10/04/24 (!) 136/100   09/20/24 (!) 154/120   09/17/24 (!) 140/100        Review of Systems     Review of Systems   Constitutional:  Positive for fatigue and fever.   HENT:  Positive for nasal congestion and sinus pressure/congestion.    Eyes: Negative.    Respiratory: Negative.     Cardiovascular: Negative.    Gastrointestinal: Negative.    Endocrine: Negative.    Genitourinary: Negative.  Positive for menstrual irregularity.   Musculoskeletal:  Positive for arthralgias, back pain and myalgias.   Integumentary:  Positive for rash.   Allergic/Immunologic: Negative.    Neurological:  Positive for headaches.   Hematological: Negative.    Psychiatric/Behavioral: Negative.          Medical / Social / Family History     Past Medical History:   Diagnosis Date    Acute cystitis with hematuria 05/01/2023    Hypertension        Past Surgical History:   Procedure Laterality Date    ADENOIDECTOMY      TUBAL LIGATION         Social History  Ms.  reports that she has never smoked. She has never been exposed to tobacco smoke. She has never used smokeless tobacco. She  "reports that she does not currently use alcohol. She reports current drug use. Drug: Marijuana.    Family History  Ms.'s family history includes Cancer in her mother.    Medications and Allergies     Medications  Outpatient Medications Marked as Taking for the 10/4/24 encounter (Office Visit) with Lyndsay Huddleston FNP   Medication Sig Dispense Refill    amLODIPine (NORVASC) 10 MG tablet Take 1 tablet (10 mg total) by mouth once daily. For BLOOD PRESSURE 90 tablet 3    hydroCHLOROthiazide (HYDRODIURIL) 25 MG tablet Take 1 tablet (25 mg total) by mouth once daily. 90 tablet 3       Allergies  Review of patient's allergies indicates:   Allergen Reactions    Darvocet a500 [propoxyphene n-acetaminophen]        Physical Examination     Vitals:    10/04/24 1536   BP: (!) 136/100   BP Location: Left arm   Patient Position: Sitting   Pulse: 98   Resp: 18   Temp: 98.3 °F (36.8 °C)   TempSrc: Oral   SpO2: 96%   Weight: 80.1 kg (176 lb 9.6 oz)   Height: 5' 1" (1.549 m)      Physical Exam  Constitutional:       Appearance: Normal appearance.   HENT:      Head: Normocephalic.      Right Ear: Hearing, ear canal and external ear normal. A middle ear effusion is present.      Left Ear: Hearing, ear canal and external ear normal. A middle ear effusion is present.      Nose: Congestion present.      Right Turbinates: Swollen.      Left Turbinates: Swollen.      Right Sinus: Maxillary sinus tenderness present.      Left Sinus: Maxillary sinus tenderness present.      Mouth/Throat:      Mouth: Mucous membranes are moist.      Pharynx: Oropharynx is clear.   Eyes:      Extraocular Movements: Extraocular movements intact.   Cardiovascular:      Rate and Rhythm: Normal rate and regular rhythm.      Pulses: Normal pulses.      Heart sounds: Normal heart sounds.   Pulmonary:      Effort: Pulmonary effort is normal.      Breath sounds: Normal breath sounds.   Skin:     General: Skin is warm and dry.      Capillary Refill: Capillary refill " takes less than 2 seconds.      Findings: Rash present. Rash is urticarial.      Comments: Pt does have generalized, erythematous, pruritic rash to bilateral upper arms   Neurological:      General: No focal deficit present.      Mental Status: She is alert and oriented to person, place, and time.   Psychiatric:         Mood and Affect: Mood normal.         Behavior: Behavior normal.        Assessment and Plan (including Health Maintenance)      Problem List  Smart Sets  Document Outside HM   :    Plan:     There are no Patient Instructions on file for this visit.       Health Maintenance Due   Topic Date Due    Hepatitis C Screening  Never done    Cervical Cancer Screening  Never done    TETANUS VACCINE  Never done    Mammogram  Never done    Influenza Vaccine (1) Never done    COVID-19 Vaccine (1 - 2024-25 season) Never done       Problem List Items Addressed This Visit       Acute maxillary sinusitis    Current Assessment & Plan     Flu, strep, covid negative.   Augmentin prescribed to take as directed. Instructed to take all abx until gone even if start to feel better.    Instructed may alternate Tylenol/Motrin for pain/fever if not contraindicated.    Increase water intake.   Instructed to RTC for any new/worsening/persisting ssx.           Relevant Medications    amoxicillin-clavulanate 875-125mg (AUGMENTIN) 875-125 mg per tablet    Body aches    Current Assessment & Plan     Flu, strep, covid negative.   Instructed may alternate Tylenol/Motrin for pain/fever if not contraindicated.    Instructed to RTC for any new/worsening/persisting ssx.           Relevant Orders    POCT COVID-19 Rapid Screening (Completed)    POCT Influenza A/B Molecular (Completed)    POCT Strep A, Molecular (Completed)    POCT URINALYSIS W/O SCOPE (Completed)    Fatigue    Current Assessment & Plan     Flu, strep, covid negative.   Instructed may alternate Tylenol/Motrin for pain/fever if not contraindicated.    Instructed to RTC  for any new/worsening/persisting ssx.           Relevant Orders    POCT COVID-19 Rapid Screening (Completed)    POCT Influenza A/B Molecular (Completed)    POCT Strep A, Molecular (Completed)    POCT URINALYSIS W/O SCOPE (Completed)    Fever    Current Assessment & Plan     Flu, strep, covid negative.   Instructed may alternate Tylenol/Motrin for pain/fever if not contraindicated.    Instructed to RTC for any new/worsening/persisting ssx.           Relevant Orders    POCT COVID-19 Rapid Screening (Completed)    POCT Influenza A/B Molecular (Completed)    POCT Strep A, Molecular (Completed)    POCT URINALYSIS W/O SCOPE (Completed)    Hypertension - Primary    Current Assessment & Plan     Stop HCTZ.   Losartan 25mg daily prescribed.   Continue to monitor bp. Will call and check on pt Monday.            Relevant Medications    losartan (COZAAR) 25 MG tablet    Rash    Current Assessment & Plan     Pt has not taken any allergy meds for rash. She believes it is from helping her daughter clean her house. Instructed she could also have a rash with the sickness she is having and may resolve on its own. Pt vu.   Instructed to RTC for any new/worsening/persisting ssx.            Primary hypertension  -     losartan (COZAAR) 25 MG tablet; Take 1 tablet (25 mg total) by mouth once daily.  Dispense: 90 tablet; Refill: 3    Fatigue, unspecified type  -     POCT COVID-19 Rapid Screening  -     POCT Influenza A/B Molecular  -     POCT Strep A, Molecular  -     POCT URINALYSIS W/O SCOPE    Body aches  -     POCT COVID-19 Rapid Screening  -     POCT Influenza A/B Molecular  -     POCT Strep A, Molecular  -     POCT URINALYSIS W/O SCOPE    Fever, unspecified fever cause  -     POCT COVID-19 Rapid Screening  -     POCT Influenza A/B Molecular  -     POCT Strep A, Molecular  -     POCT URINALYSIS W/O SCOPE    Acute maxillary sinusitis, recurrence not specified  -     amoxicillin-clavulanate 875-125mg (AUGMENTIN) 875-125 mg per tablet;  Take 1 tablet by mouth every 12 (twelve) hours. for 10 days  Dispense: 20 tablet; Refill: 0    Rash       Health Maintenance Topics with due status: Not Due       Topic Last Completion Date    Hemoglobin A1c (Diabetic Prevention Screening) 04/18/2023    RSV Vaccine (Age 60+ and Pregnant patients) Not Due         No future appointments.     No follow-ups on file.    Health Maintenance Due   Topic Date Due    Hepatitis C Screening  Never done    Cervical Cancer Screening  Never done    TETANUS VACCINE  Never done    Mammogram  Never done    Influenza Vaccine (1) Never done    COVID-19 Vaccine (1 - 2024-25 season) Never done        Signature:  IRAIDA Greenfield    Date of encounter: 10/4/24

## 2024-10-04 NOTE — ASSESSMENT & PLAN NOTE
Flu, strep, covid negative.   Augmentin prescribed to take as directed. Instructed to take all abx until gone even if start to feel better.    Instructed may alternate Tylenol/Motrin for pain/fever if not contraindicated.    Increase water intake.   Instructed to RTC for any new/worsening/persisting ssx.

## 2024-10-04 NOTE — ASSESSMENT & PLAN NOTE
Stop HCTZ.   Losartan 25mg daily prescribed.   Continue to monitor bp. Will call and check on pt Monday.

## 2024-10-04 NOTE — ASSESSMENT & PLAN NOTE
Flu, strep, covid negative.   Instructed may alternate Tylenol/Motrin for pain/fever if not contraindicated.    Instructed to RTC for any new/worsening/persisting ssx.

## 2024-10-04 NOTE — ASSESSMENT & PLAN NOTE
Pt has not taken any allergy meds for rash. She believes it is from helping her daughter clean her house. Instructed she could also have a rash with the sickness she is having and may resolve on its own. Pt vu.   Instructed to RTC for any new/worsening/persisting ssx.

## 2024-10-18 ENCOUNTER — OFFICE VISIT (OUTPATIENT)
Dept: FAMILY MEDICINE | Facility: CLINIC | Age: 44
End: 2024-10-18

## 2024-10-18 VITALS
HEIGHT: 61 IN | BODY MASS INDEX: 33.64 KG/M2 | RESPIRATION RATE: 20 BRPM | TEMPERATURE: 99 F | OXYGEN SATURATION: 99 % | HEART RATE: 98 BPM | WEIGHT: 178.19 LBS | DIASTOLIC BLOOD PRESSURE: 95 MMHG | SYSTOLIC BLOOD PRESSURE: 136 MMHG

## 2024-10-18 DIAGNOSIS — J01.00 ACUTE NON-RECURRENT MAXILLARY SINUSITIS: Primary | ICD-10-CM

## 2024-10-18 DIAGNOSIS — R52 BODY ACHES: ICD-10-CM

## 2024-10-18 DIAGNOSIS — R50.9 FEVER, UNSPECIFIED FEVER CAUSE: ICD-10-CM

## 2024-10-18 DIAGNOSIS — R05.9 COUGH, UNSPECIFIED TYPE: ICD-10-CM

## 2024-10-18 LAB
CTP QC/QA: YES
MOLECULAR STREP A: NEGATIVE
POC MOLECULAR INFLUENZA A AGN: NEGATIVE
POC MOLECULAR INFLUENZA B AGN: NEGATIVE
SARS-COV-2 RDRP RESP QL NAA+PROBE: NEGATIVE

## 2024-10-18 PROCEDURE — 87635 SARS-COV-2 COVID-19 AMP PRB: CPT | Mod: QW,,, | Performed by: NURSE PRACTITIONER

## 2024-10-18 PROCEDURE — 99214 OFFICE O/P EST MOD 30 MIN: CPT | Mod: 25,,, | Performed by: NURSE PRACTITIONER

## 2024-10-18 PROCEDURE — 87502 INFLUENZA DNA AMP PROBE: CPT | Mod: QW,,, | Performed by: NURSE PRACTITIONER

## 2024-10-18 PROCEDURE — 96372 THER/PROPH/DIAG INJ SC/IM: CPT | Mod: ,,, | Performed by: NURSE PRACTITIONER

## 2024-10-18 PROCEDURE — 87651 STREP A DNA AMP PROBE: CPT | Mod: QW,,, | Performed by: NURSE PRACTITIONER

## 2024-10-18 RX ORDER — DEXAMETHASONE SODIUM PHOSPHATE 4 MG/ML
4 INJECTION, SOLUTION INTRA-ARTICULAR; INTRALESIONAL; INTRAMUSCULAR; INTRAVENOUS; SOFT TISSUE
Status: COMPLETED | OUTPATIENT
Start: 2024-10-18 | End: 2024-10-18

## 2024-10-18 RX ORDER — CEFTRIAXONE 1 G/1
1 INJECTION, POWDER, FOR SOLUTION INTRAMUSCULAR; INTRAVENOUS
Status: COMPLETED | OUTPATIENT
Start: 2024-10-18 | End: 2024-10-18

## 2024-10-18 RX ADMIN — DEXAMETHASONE SODIUM PHOSPHATE 4 MG: 4 INJECTION, SOLUTION INTRA-ARTICULAR; INTRALESIONAL; INTRAMUSCULAR; INTRAVENOUS; SOFT TISSUE at 03:10

## 2024-10-18 RX ADMIN — CEFTRIAXONE 1 G: 1 INJECTION, POWDER, FOR SOLUTION INTRAMUSCULAR; INTRAVENOUS at 03:10

## 2024-10-18 NOTE — PROGRESS NOTES
IRAIDA Greenfield   [facility]  [address]  [place] [address]     PATIENT NAME: Blanca Campoverde  : 1980  DATE: 10/18/24  MRN: [id number]      Billing Provider: IRAIDA Greenfield  Level of Service:   Patient PCP Information       Provider PCP Type    IRAIDA Greenfield General            Reason for Visit / Chief Complaint: Sore Throat (Sore throat, sinus congestion started yesterday)   Health Maintenance Due   Topic Date Due    Hepatitis C Screening  Never done    Cervical Cancer Screening  Never done    TETANUS VACCINE  Never done    Mammogram  Never done    Influenza Vaccine (1) Never done    COVID-19 Vaccine ( season) Never done          Update PCP  Update Chief Complaint         History of Present Illness / Problem Focused Workflow     Blanca Campoverde presents to the clinic for sick visit. She is having headaches, runny nose, low grade fever, earaches, diarrhea, productive cough with green sputum, nasal congestion for the past 2 days. She states she has been her granchild that has a cold. She states she did not  abx from last visit. She denies any other needs a this time. NAD noted.        CMP  Sodium   Date Value Ref Range Status   2024 135 (L) 136 - 145 mmol/L Final     Potassium   Date Value Ref Range Status   2024 3.5 3.5 - 5.1 mmol/L Final     Chloride   Date Value Ref Range Status   2024 99 98 - 107 mmol/L Final     CO2   Date Value Ref Range Status   2024 26 21 - 32 mmol/L Final     Glucose   Date Value Ref Range Status   2024 143 (H) 74 - 106 mg/dL Final     BUN   Date Value Ref Range Status   2024 16 7 - 18 mg/dL Final     Creatinine   Date Value Ref Range Status   2024 0.75 0.55 - 1.02 mg/dL Final     Calcium   Date Value Ref Range Status   2024 8.9 8.5 - 10.1 mg/dL Final     Total Protein   Date Value Ref Range Status   2024 8.2 6.4 - 8.2 g/dL Final     Albumin   Date Value Ref Range Status   2024 3.8 3.5 -  5.0 g/dL Final     Bilirubin, Total   Date Value Ref Range Status   09/17/2024 0.2 >0.0 - 1.2 mg/dL Final     Alk Phos   Date Value Ref Range Status   09/17/2024 87 37 - 98 U/L Final     AST   Date Value Ref Range Status   09/17/2024 8 (L) 15 - 37 U/L Final     ALT   Date Value Ref Range Status   09/17/2024 15 13 - 56 U/L Final     Anion Gap   Date Value Ref Range Status   09/17/2024 14 7 - 16 mmol/L Final     eGFR   Date Value Ref Range Status   09/17/2024 101 >=60 mL/min/1.73m2 Final        Lab Results   Component Value Date    WBC 12.92 (H) 09/17/2024    RBC 5.49 (H) 09/17/2024    HGB 10.9 (L) 09/17/2024    HCT 37.8 (L) 09/17/2024    MCV 68.9 (L) 09/17/2024    MCH 19.9 (L) 09/17/2024    MCHC 28.8 (L) 09/17/2024    RDW 22.8 (H) 09/17/2024     (H) 09/17/2024    MPV 9.5 09/17/2024    LYMPH 25.2 (L) 09/17/2024    LYMPH 3.25 09/17/2024    MONO 5.3 09/17/2024    EOS 0.21 09/17/2024    BASO 0.09 09/17/2024    EOSINOPHIL 1.6 09/17/2024    BASOPHIL 0.7 09/17/2024        Lab Results   Component Value Date    CHOL 225 (H) 09/17/2024     Lab Results   Component Value Date    HDL 69 (H) 09/17/2024     Lab Results   Component Value Date    LDLCALC 114 09/17/2024     Lab Results   Component Value Date    TRIG 211 (H) 09/17/2024     Lab Results   Component Value Date    CHOLHDL 3.3 09/17/2024        Wt Readings from Last 3 Encounters:   10/18/24 1417 80.8 kg (178 lb 3.2 oz)   10/04/24 1536 80.1 kg (176 lb 9.6 oz)   09/17/24 1051 80 kg (176 lb 6.4 oz)        BP Readings from Last 3 Encounters:   10/18/24 (!) 136/95   10/04/24 (!) 136/100   09/20/24 (!) 154/120        Review of Systems     Review of Systems   Constitutional:  Positive for fever.   HENT:  Positive for nasal congestion, ear pain, postnasal drip, rhinorrhea, sinus pressure/congestion and sore throat.    Eyes: Negative.    Respiratory:  Positive for cough.    Cardiovascular: Negative.    Gastrointestinal:  Positive for diarrhea.   Endocrine: Negative.   "  Genitourinary: Negative.    Musculoskeletal: Negative.    Integumentary:  Negative.   Allergic/Immunologic: Negative.    Neurological:  Positive for headaches.   Hematological: Negative.    Psychiatric/Behavioral: Negative.          Medical / Social / Family History     Past Medical History:   Diagnosis Date    Acute cystitis with hematuria 05/01/2023    Hypertension     Rash 10/04/2024       Past Surgical History:   Procedure Laterality Date    ADENOIDECTOMY      TUBAL LIGATION         Social History  Ms.  reports that she has never smoked. She has never been exposed to tobacco smoke. She has never used smokeless tobacco. She reports that she does not currently use alcohol. She reports current drug use. Drugs: Marijuana and Benzodiazepines.    Family History  Ms.'s family history includes Cancer in her mother.    Medications and Allergies     Medications  Outpatient Medications Marked as Taking for the 10/18/24 encounter (Office Visit) with Lyndsay Huddleston FNP   Medication Sig Dispense Refill    amLODIPine (NORVASC) 10 MG tablet Take 1 tablet (10 mg total) by mouth once daily. For BLOOD PRESSURE 90 tablet 3    hydroCHLOROthiazide (HYDRODIURIL) 25 MG tablet Take 1 tablet (25 mg total) by mouth once daily. 90 tablet 3    losartan (COZAAR) 25 MG tablet Take 1 tablet (25 mg total) by mouth once daily. 90 tablet 3       Allergies  Review of patient's allergies indicates:   Allergen Reactions    Darvocet a500 [propoxyphene n-acetaminophen]        Physical Examination     Vitals:    10/18/24 1417   BP: (!) 136/95   BP Location: Left arm   Patient Position: Sitting   Pulse: 98   Resp: 20   Temp: 99.1 °F (37.3 °C)   TempSrc: Oral   SpO2: 99%   Weight: 80.8 kg (178 lb 3.2 oz)   Height: 5' 1" (1.549 m)      Physical Exam  Constitutional:       Appearance: Normal appearance.   HENT:      Head: Normocephalic.      Right Ear: Hearing, tympanic membrane, ear canal and external ear normal.      Left Ear: Hearing, tympanic " membrane, ear canal and external ear normal.      Nose: Congestion present.      Right Turbinates: Swollen.      Left Turbinates: Swollen.      Right Sinus: Maxillary sinus tenderness present.      Left Sinus: Maxillary sinus tenderness present.      Mouth/Throat:      Mouth: Mucous membranes are moist.      Comments: PND noted.   Eyes:      Extraocular Movements: Extraocular movements intact.   Cardiovascular:      Rate and Rhythm: Normal rate and regular rhythm.      Pulses: Normal pulses.      Heart sounds: Normal heart sounds.   Pulmonary:      Effort: Pulmonary effort is normal.      Breath sounds: Normal breath sounds.   Skin:     General: Skin is warm and dry.      Capillary Refill: Capillary refill takes less than 2 seconds.   Neurological:      General: No focal deficit present.      Mental Status: She is alert and oriented to person, place, and time.   Psychiatric:         Mood and Affect: Mood normal.         Behavior: Behavior normal.          Assessment and Plan (including Health Maintenance)      Problem List  Smart Sets  Document Outside HM   :    Plan:     There are no Patient Instructions on file for this visit.       Health Maintenance Due   Topic Date Due    Hepatitis C Screening  Never done    Cervical Cancer Screening  Never done    TETANUS VACCINE  Never done    Mammogram  Never done    Influenza Vaccine (1) Never done    COVID-19 Vaccine (1 - 2024-25 season) Never done       Problem List Items Addressed This Visit       Acute maxillary sinusitis - Primary    Current Assessment & Plan     Flu, strep, covid negative.   Rocephin 1G/Decadron 4mg injections given. No adverse reaction noted.   Instructed to take Augmentin prescribed at last visit.    Instructed may alternate Tylenol/Motrin for pain/fever if not contraindicated.    Increase water intake.   Instructed to RTC for any new/worsening/persisting ssx.           Body aches    Current Assessment & Plan     Flu, strep, covid negative.    Instructed may alternate Tylenol/Motrin for pain/fever if not contraindicated.    Instructed to RTC for any new/worsening/persisting ssx.          Relevant Orders    POCT Strep A, Molecular (Completed)    POCT Influenza A/B Molecular (Completed)    POCT COVID-19 Rapid Screening (Completed)    Cough    Current Assessment & Plan     See above plan.   May take otc cough suppressants such as mucinex dm that will not interfere with bp as needed.   Instructed to RTC for any new/worsening/persisting ssx.           Relevant Orders    POCT Strep A, Molecular (Completed)    POCT Influenza A/B Molecular (Completed)    POCT COVID-19 Rapid Screening (Completed)    Fever    Current Assessment & Plan     Flu, strep, covid negative.   Instructed may alternate Tylenol/Motrin for pain/fever if not contraindicated.    Instructed to RTC for any new/worsening/persisting ssx.          Relevant Orders    POCT Strep A, Molecular (Completed)    POCT Influenza A/B Molecular (Completed)    POCT COVID-19 Rapid Screening (Completed)     Acute non-recurrent maxillary sinusitis  -     cefTRIAXone injection 1 g  -     dexAMETHasone injection 4 mg    Body aches  -     POCT Strep A, Molecular  -     POCT Influenza A/B Molecular  -     POCT COVID-19 Rapid Screening    Fever, unspecified fever cause  -     POCT Strep A, Molecular  -     POCT Influenza A/B Molecular  -     POCT COVID-19 Rapid Screening    Cough, unspecified type  -     POCT Strep A, Molecular  -     POCT Influenza A/B Molecular  -     POCT COVID-19 Rapid Screening       Health Maintenance Topics with due status: Not Due       Topic Last Completion Date    Hemoglobin A1c (Diabetic Prevention Screening) 04/18/2023    RSV Vaccine (Age 60+ and Pregnant patients) Not Due         No future appointments.       Follow up if symptoms worsen or fail to improve.    Health Maintenance Due   Topic Date Due    Hepatitis C Screening  Never done    Cervical Cancer Screening  Never done    TETANUS  VACCINE  Never done    Mammogram  Never done    Influenza Vaccine (1) Never done    COVID-19 Vaccine (1 - 2024-25 season) Never done        Signature:  IRAIDA Greenfield    Date of encounter: 10/18/24

## 2024-11-03 PROBLEM — R05.9 COUGH: Status: ACTIVE | Noted: 2024-11-03

## 2024-11-03 PROBLEM — R21 RASH: Status: RESOLVED | Noted: 2024-10-04 | Resolved: 2024-11-03

## 2024-11-04 NOTE — ASSESSMENT & PLAN NOTE
See above plan.   May take otc cough suppressants such as mucinex dm that will not interfere with bp as needed.   Instructed to RTC for any new/worsening/persisting ssx.

## 2024-11-04 NOTE — ASSESSMENT & PLAN NOTE
Flu, strep, covid negative.   Rocephin 1G/Decadron 4mg injections given. No adverse reaction noted.   Instructed to take Augmentin prescribed at last visit.    Instructed may alternate Tylenol/Motrin for pain/fever if not contraindicated.    Increase water intake.   Instructed to RTC for any new/worsening/persisting ssx.

## 2024-11-13 ENCOUNTER — OFFICE VISIT (OUTPATIENT)
Dept: FAMILY MEDICINE | Facility: CLINIC | Age: 44
End: 2024-11-13

## 2024-11-13 VITALS
TEMPERATURE: 98 F | RESPIRATION RATE: 18 BRPM | OXYGEN SATURATION: 98 % | BODY MASS INDEX: 33.23 KG/M2 | WEIGHT: 176 LBS | HEART RATE: 73 BPM | SYSTOLIC BLOOD PRESSURE: 146 MMHG | DIASTOLIC BLOOD PRESSURE: 100 MMHG | HEIGHT: 61 IN

## 2024-11-13 DIAGNOSIS — R10.9 FLANK PAIN: ICD-10-CM

## 2024-11-13 DIAGNOSIS — I10 HYPERTENSION, UNSPECIFIED TYPE: Primary | ICD-10-CM

## 2024-11-13 PROCEDURE — 99213 OFFICE O/P EST LOW 20 MIN: CPT | Mod: ,,,

## 2024-11-13 NOTE — ASSESSMENT & PLAN NOTE
Will get UA due to lower back hurting at times.   She is on her cycle and states that is why her back more than likely is hurting. It cramps.

## 2024-11-13 NOTE — PROGRESS NOTES
HPI:   Blanca Campoverde is a pleasant 44 y.o. patient who reports to clinic with complaints of headache and elevated blood pressure since last night. She has a history of Hypertension that is hard to control. She's states her blood pressure was 168/113 last night. She states that her vision has not been blurry, it has in the past but not this time. She states that she was changed from the HCTZ because it wasn't helping to the Norvasc and losartan 25 mg. She states that they haven't been controlling her well. She states she limits the salt intake that she has when eating. We discussed a healthy diet and needing to exercise at least 30 minutes a day. She denies any shortness of breath or chest pain. She denies any other issues.                     Past Medical History:   Diagnosis Date    Acute cystitis with hematuria 05/01/2023    Hypertension     Rash 10/04/2024       PAST SURGICAL HISTORY:   Past Surgical History:   Procedure Laterality Date    ADENOIDECTOMY      TUBAL LIGATION         MEDICATIONS:    Current Outpatient Medications:     amLODIPine (NORVASC) 10 MG tablet, Take 1 tablet (10 mg total) by mouth once daily. For BLOOD PRESSURE, Disp: 90 tablet, Rfl: 3    losartan (COZAAR) 25 MG tablet, Take 1 tablet (25 mg total) by mouth once daily., Disp: 90 tablet, Rfl: 3    hydroCHLOROthiazide (HYDRODIURIL) 25 MG tablet, Take 1 tablet (25 mg total) by mouth once daily. (Patient not taking: Reported on 11/13/2024), Disp: 90 tablet, Rfl: 3    ALLERGIES:   Review of patient's allergies indicates:   Allergen Reactions    Darvocet a500 [propoxyphene n-acetaminophen]          Review of Systems   Constitutional: Negative.  Negative for activity change, chills and fever.   HENT: Negative.  Negative for drooling and nosebleeds.    Eyes: Negative.    Respiratory: Negative.  Negative for chest tightness.    Cardiovascular: Negative.  Negative for chest pain and palpitations.   Gastrointestinal: Negative.    Endocrine: Negative.     Genitourinary: Negative.  Negative for difficulty urinating.   Musculoskeletal: Negative.    Integumentary:  Negative.   Allergic/Immunologic: Negative.    Neurological:  Positive for headaches. Negative for dizziness.   Hematological: Negative.    Psychiatric/Behavioral: Negative.     All other systems reviewed and are negative.         Physical Exam  Constitutional:       General: She is not in acute distress.     Appearance: Normal appearance. She is well-developed. She is obese. She is not ill-appearing.   HENT:      Head: Normocephalic and atraumatic.      Right Ear: Tympanic membrane normal.      Left Ear: Tympanic membrane normal.      Nose: Nose normal.      Mouth/Throat:      Mouth: Mucous membranes are moist.      Pharynx: Oropharynx is clear. No posterior oropharyngeal erythema.   Cardiovascular:      Rate and Rhythm: Normal rate and regular rhythm.      Pulses: Normal pulses.      Heart sounds: Normal heart sounds.   Pulmonary:      Effort: Pulmonary effort is normal. No accessory muscle usage or respiratory distress.      Breath sounds: Normal breath sounds.   Abdominal:      General: Abdomen is flat. Bowel sounds are normal. There is no distension.      Palpations: Abdomen is soft.      Tenderness: There is no abdominal tenderness.   Musculoskeletal:         General: Normal range of motion.      Cervical back: Normal range of motion.   Skin:     General: Skin is warm and dry.      Capillary Refill: Capillary refill takes less than 2 seconds.   Neurological:      Mental Status: She is alert and oriented to person, place, and time. Mental status is at baseline.   Psychiatric:         Mood and Affect: Mood normal.         Speech: Speech normal.         Behavior: Behavior normal. Behavior is cooperative.         Thought Content: Thought content normal.          VITAL SIGNS:   BP (!) 146/100 (BP Location: Right arm, Patient Position: Sitting)   Pulse 73   Temp 98.3 °F (36.8 °C) (Oral)   Resp 18   Ht  "5' 1" (1.549 m)   Wt 79.8 kg (176 lb)   SpO2 98%   BMI 33.25 kg/m²       ASSESSMENT/PLAN  1. Hypertension, unspecified type  Assessment & Plan:  Increase Losartan to 50 mg daily. (She doesn't need the medication sent in because she wants to take two of the 25 mg she has because she has a lot of those)  Continue Norvasc 10 mg   -Take blood pressure once a day and write down on a blood pressure log. Return to the clinic in one week for a nurse blood pressue check and bring the blood pressure log and give to the nurse.   -Low salt, low sodium diet, less fried foods, more baked foods, more green leafy vegetables, more fruits, less bread  -Exercise at least 30-45 minutes a day  -Follow up in 1 month.   She is going to return her blood pressure log I gave her Friday and we will discuss if we need to increase Losartan to 100 mg after trying the 50 mg.        2. Flank pain  Assessment & Plan:  Will get UA due to lower back hurting at times.   She is on her cycle and states that is why her back more than likely is hurting. It cramps.     Orders:  -     POCT URINALYSIS W/O SCOPE             Patient Instructions   -Take blood pressure once a day and write down on a blood pressure log. Return to the clinic in one week for a nurse blood pressue check and bring the blood pressure log and give to the nurse.   -Low salt, low sodium diet, less fried foods, more baked foods, more green leafy vegetables, more fruits, less bread  -CBC, CMP today. Will call with results.   -Exercise at least 30-45 minutes a day  -Take losartan 50 mg daily  -Take Norvasc 10 mg daily  - keep log after increasing. Follow up if blood pressure is still high Friday.        No orders of the defined types were placed in this encounter.          "

## 2024-11-13 NOTE — ASSESSMENT & PLAN NOTE
Increase Losartan to 50 mg daily. (She doesn't need the medication sent in because she wants to take two of the 25 mg she has because she has a lot of those)  Continue Norvasc 10 mg   -Take blood pressure once a day and write down on a blood pressure log. Return to the clinic in one week for a nurse blood pressue check and bring the blood pressure log and give to the nurse.   -Low salt, low sodium diet, less fried foods, more baked foods, more green leafy vegetables, more fruits, less bread  -Exercise at least 30-45 minutes a day  -Follow up in 1 month.   She is going to return her blood pressure log I gave her Friday and we will discuss if we need to increase Losartan to 100 mg after trying the 50 mg.

## 2024-11-13 NOTE — PATIENT INSTRUCTIONS
-Take blood pressure once a day and write down on a blood pressure log. Return to the clinic in one week for a nurse blood pressue check and bring the blood pressure log and give to the nurse.   -Low salt, low sodium diet, less fried foods, more baked foods, more green leafy vegetables, more fruits, less bread  -CBC, CMP today. Will call with results.   -Exercise at least 30-45 minutes a day  -Take losartan 50 mg daily  -Take Norvasc 10 mg daily  - keep log after increasing. Follow up if blood pressure is still high Friday.

## 2025-01-16 ENCOUNTER — PATIENT OUTREACH (OUTPATIENT)
Facility: HOSPITAL | Age: 45
End: 2025-01-16

## 2025-01-16 NOTE — PROGRESS NOTES
Population Health Chart Review & Patient Outreach Details    Updates Requested / Reviewed:  [x]  Care Team Updated  [x]  Care Everywhere Updated & Reviewed  [x]   Reviewed    Health Maintenance Topics Addressed and Outreach Outcomes / Actions Taken:  Breast Cancer Screening [x] No answer, no vm set up.